# Patient Record
Sex: FEMALE | Race: WHITE | NOT HISPANIC OR LATINO
[De-identification: names, ages, dates, MRNs, and addresses within clinical notes are randomized per-mention and may not be internally consistent; named-entity substitution may affect disease eponyms.]

---

## 2017-08-12 ENCOUNTER — APPOINTMENT (OUTPATIENT)
Dept: MRI IMAGING | Facility: IMAGING CENTER | Age: 82
End: 2017-08-12
Payer: MEDICARE

## 2017-08-12 ENCOUNTER — OUTPATIENT (OUTPATIENT)
Dept: OUTPATIENT SERVICES | Facility: HOSPITAL | Age: 82
LOS: 1 days | End: 2017-08-12
Payer: COMMERCIAL

## 2017-08-12 DIAGNOSIS — Z00.8 ENCOUNTER FOR OTHER GENERAL EXAMINATION: ICD-10-CM

## 2017-08-12 PROCEDURE — 82565 ASSAY OF CREATININE: CPT

## 2017-08-12 PROCEDURE — 0159T: CPT | Mod: 26

## 2017-08-12 PROCEDURE — A9585: CPT

## 2017-08-12 PROCEDURE — 77059 MRI BREAST BILATERAL: CPT | Mod: 26

## 2017-08-12 PROCEDURE — C8908: CPT

## 2017-08-12 PROCEDURE — C8937: CPT

## 2018-05-18 ENCOUNTER — APPOINTMENT (OUTPATIENT)
Dept: PLASTIC SURGERY | Facility: CLINIC | Age: 83
End: 2018-05-18
Payer: MEDICARE

## 2018-05-18 DIAGNOSIS — Z78.9 OTHER SPECIFIED HEALTH STATUS: ICD-10-CM

## 2018-05-18 DIAGNOSIS — G56.03 CARPAL TUNNEL SYNDROM,BILATERAL UPPER LIMBS: ICD-10-CM

## 2018-05-18 PROCEDURE — 99204 OFFICE O/P NEW MOD 45 MIN: CPT

## 2018-05-21 PROBLEM — Z78.9 NON-SMOKER: Status: ACTIVE | Noted: 2018-05-18

## 2018-05-21 PROBLEM — Z78.9 DRINKS WINE: Status: ACTIVE | Noted: 2018-05-18

## 2018-07-18 ENCOUNTER — APPOINTMENT (OUTPATIENT)
Dept: NEUROLOGY | Facility: CLINIC | Age: 83
End: 2018-07-18
Payer: MEDICARE

## 2018-07-18 PROCEDURE — 95910 NRV CNDJ TEST 7-8 STUDIES: CPT

## 2018-07-18 PROCEDURE — 95885 MUSC TST DONE W/NERV TST LIM: CPT

## 2018-12-07 ENCOUNTER — INPATIENT (INPATIENT)
Facility: HOSPITAL | Age: 83
LOS: 4 days | Discharge: HOME HEALTH | DRG: 286 | End: 2018-12-12
Attending: STUDENT IN AN ORGANIZED HEALTH CARE EDUCATION/TRAINING PROGRAM | Admitting: INTERNAL MEDICINE
Payer: COMMERCIAL

## 2018-12-07 VITALS
HEART RATE: 111 BPM | DIASTOLIC BLOOD PRESSURE: 108 MMHG | SYSTOLIC BLOOD PRESSURE: 161 MMHG | OXYGEN SATURATION: 90 % | WEIGHT: 113.54 LBS

## 2018-12-07 DIAGNOSIS — R07.9 CHEST PAIN, UNSPECIFIED: ICD-10-CM

## 2018-12-07 LAB
ALBUMIN SERPL ELPH-MCNC: 3.9 G/DL — SIGNIFICANT CHANGE UP (ref 3.3–5)
ALP SERPL-CCNC: 91 U/L — SIGNIFICANT CHANGE UP (ref 40–120)
ALT FLD-CCNC: 15 U/L — SIGNIFICANT CHANGE UP (ref 10–45)
ANION GAP SERPL CALC-SCNC: 15 MMOL/L — SIGNIFICANT CHANGE UP (ref 5–17)
APTT BLD: 29.7 SEC — SIGNIFICANT CHANGE UP (ref 27.5–36.3)
AST SERPL-CCNC: 26 U/L — SIGNIFICANT CHANGE UP (ref 10–40)
BILIRUB SERPL-MCNC: 0.3 MG/DL — SIGNIFICANT CHANGE UP (ref 0.2–1.2)
BUN SERPL-MCNC: 24 MG/DL — HIGH (ref 7–23)
CALCIUM SERPL-MCNC: 9.9 MG/DL — SIGNIFICANT CHANGE UP (ref 8.4–10.5)
CHLORIDE SERPL-SCNC: 100 MMOL/L — SIGNIFICANT CHANGE UP (ref 96–108)
CO2 SERPL-SCNC: 20 MMOL/L — LOW (ref 22–31)
CREAT SERPL-MCNC: 0.82 MG/DL — SIGNIFICANT CHANGE UP (ref 0.5–1.3)
GLUCOSE SERPL-MCNC: 131 MG/DL — HIGH (ref 70–99)
HCT VFR BLD CALC: 44.1 % — SIGNIFICANT CHANGE UP (ref 34.5–45)
HGB BLD-MCNC: 15.1 G/DL — SIGNIFICANT CHANGE UP (ref 11.5–15.5)
INR BLD: 0.94 RATIO — SIGNIFICANT CHANGE UP (ref 0.88–1.16)
MCHC RBC-ENTMCNC: 30.4 PG — SIGNIFICANT CHANGE UP (ref 27–34)
MCHC RBC-ENTMCNC: 34.4 GM/DL — SIGNIFICANT CHANGE UP (ref 32–36)
MCV RBC AUTO: 88.5 FL — SIGNIFICANT CHANGE UP (ref 80–100)
NT-PROBNP SERPL-SCNC: 128 PG/ML — SIGNIFICANT CHANGE UP (ref 0–300)
PLATELET # BLD AUTO: 157 K/UL — SIGNIFICANT CHANGE UP (ref 150–400)
POTASSIUM SERPL-MCNC: 4.2 MMOL/L — SIGNIFICANT CHANGE UP (ref 3.5–5.3)
POTASSIUM SERPL-SCNC: 4.2 MMOL/L — SIGNIFICANT CHANGE UP (ref 3.5–5.3)
PROT SERPL-MCNC: 7.6 G/DL — SIGNIFICANT CHANGE UP (ref 6–8.3)
PROTHROM AB SERPL-ACNC: 10.7 SEC — SIGNIFICANT CHANGE UP (ref 10–12.9)
RBC # BLD: 4.98 M/UL — SIGNIFICANT CHANGE UP (ref 3.8–5.2)
RBC # FLD: 12.6 % — SIGNIFICANT CHANGE UP (ref 10.3–14.5)
SODIUM SERPL-SCNC: 135 MMOL/L — SIGNIFICANT CHANGE UP (ref 135–145)
TROPONIN T, HIGH SENSITIVITY RESULT: 213 NG/L — HIGH (ref 0–51)
WBC # BLD: 8.3 K/UL — SIGNIFICANT CHANGE UP (ref 3.8–10.5)
WBC # FLD AUTO: 8.3 K/UL — SIGNIFICANT CHANGE UP (ref 3.8–10.5)

## 2018-12-07 PROCEDURE — 93458 L HRT ARTERY/VENTRICLE ANGIO: CPT | Mod: 26,GC

## 2018-12-07 PROCEDURE — 71045 X-RAY EXAM CHEST 1 VIEW: CPT | Mod: 26

## 2018-12-07 PROCEDURE — 99283 EMERGENCY DEPT VISIT LOW MDM: CPT

## 2018-12-07 PROCEDURE — 76937 US GUIDE VASCULAR ACCESS: CPT | Mod: 26,GC

## 2018-12-07 RX ORDER — HEPARIN SODIUM 5000 [USP'U]/ML
5000 INJECTION INTRAVENOUS; SUBCUTANEOUS EVERY 8 HOURS
Qty: 0 | Refills: 0 | Status: DISCONTINUED | OUTPATIENT
Start: 2018-12-07 | End: 2018-12-08

## 2018-12-07 RX ORDER — ATORVASTATIN CALCIUM 80 MG/1
10 TABLET, FILM COATED ORAL AT BEDTIME
Qty: 0 | Refills: 0 | Status: DISCONTINUED | OUTPATIENT
Start: 2018-12-07 | End: 2018-12-12

## 2018-12-07 RX ORDER — HEPARIN SODIUM 5000 [USP'U]/ML
5000 INJECTION INTRAVENOUS; SUBCUTANEOUS ONCE
Qty: 0 | Refills: 0 | Status: COMPLETED | OUTPATIENT
Start: 2018-12-07 | End: 2018-12-07

## 2018-12-07 RX ORDER — CHLORHEXIDINE GLUCONATE 213 G/1000ML
1 SOLUTION TOPICAL
Qty: 0 | Refills: 0 | Status: DISCONTINUED | OUTPATIENT
Start: 2018-12-07 | End: 2018-12-12

## 2018-12-07 RX ORDER — CLOPIDOGREL BISULFATE 75 MG/1
600 TABLET, FILM COATED ORAL ONCE
Qty: 0 | Refills: 0 | Status: COMPLETED | OUTPATIENT
Start: 2018-12-07 | End: 2018-12-07

## 2018-12-07 RX ORDER — ASPIRIN/CALCIUM CARB/MAGNESIUM 324 MG
81 TABLET ORAL DAILY
Qty: 0 | Refills: 0 | Status: DISCONTINUED | OUTPATIENT
Start: 2018-12-07 | End: 2018-12-12

## 2018-12-07 RX ADMIN — HEPARIN SODIUM 5000 UNIT(S): 5000 INJECTION INTRAVENOUS; SUBCUTANEOUS at 21:11

## 2018-12-07 RX ADMIN — CLOPIDOGREL BISULFATE 600 MILLIGRAM(S): 75 TABLET, FILM COATED ORAL at 21:00

## 2018-12-07 NOTE — ED PROVIDER NOTE - OBJECTIVE STATEMENT
87 YOF pmh HTN, BIBEMS from  with STEMI. Cardiology notified prior to arrival at bedside currently. Pt had episode of chest pain prior to arrival earlier in the day. Pt now currently feeling slightly SOB, denies any current chest pain. Pt denies any calf pain, no hx of DVT, PE. Pt took 650mg aspirin at home prior to going to . No other medications given. Pt states she had episode of chest pain this morning, and pt also had worse chest pain episode 2 weeks ago. 87 YOF pmh HTN, BIBEMS from  with STEMI. Cardiology notified prior to arrival at bedside currently. Pt had episode of chest pain prior to arrival earlier in the day. Pt now currently feeling slightly SOB, denies any current chest pain. Pt denies any calf pain, no hx of DVT, PE. Pt took 650mg aspirin at home prior to going to . No other medications given. Pt states she had episode of chest pain this morning, and pt also had worse chest pain episode 2 weeks ago.    Attendinyo female presents with chest pressure intermittently for a few weeks.  today symptoms were more intense.  she went to [facility] and had EKG showing ST elevation in leads V3-V5.  Pt had 650 ASA po today prior to going to [facility].  here pt has mild pressure and STEMI on EKG.  Cardiology called when ekg was faxed to us by EMS prior to pt arrival.

## 2018-12-07 NOTE — ED PROVIDER NOTE - CONSTITUTIONAL, MLM
normal... well appearing, awake, alert, oriented to person, place, time/situation and in no apparent distress.

## 2018-12-07 NOTE — ED PROVIDER NOTE - MEDICAL DECISION MAKING DETAILS
STEMI on EKG.  pt with mild symptoms now and comfortable.  cardiology at bedside.  asa already given.  will load with clopridigel and likely admit for cath.

## 2018-12-07 NOTE — CONSULT NOTE ADULT - SUBJECTIVE AND OBJECTIVE BOX
Patient seen and evaluated at bedside    Chief Complaint:  chest pain, high BP    HPI:  87F with history of HTN, HLD, breast CA s/p mastectomy who presents with 2 weeks of intermittent L sided chest pain and with increasing BP at home. She cannot recall what she was doing when it started, did not worsen with exertion. No SOB at that time. Her BP continued to be high and she continued to have intermittent chest pain so she went to urgent care today and was found to have ST elevations in V3-V6, I and aVL. She was given ASA 325mg x2 and sent to the ED. Having 1-2/10 chest tightness down in ED that resolved.   She takes lisinopril and pravachol at home.    PMH:   HTN (hypertension)  HLD  Breast CA    PSH:       Medications:       Allergies:  Allergy Status Unknown      FAMILY HISTORY:      Social History:  Smoking History:   Alcohol Use: denies  Drug Use: denies    REVIEW OF SYSTEMS:  CONSTITUTIONAL: No weakness, fevers or chills  EYES/ENT: No visual changes;  No dysphagia  NECK: No pain or stiffness  RESPIRATORY: No cough, wheezing, hemoptysis; No shortness of breath  CARDIOVASCULAR: +chest pain or palpitations; No lower extremity edema  GASTROINTESTINAL: No abdominal or epigastric pain. No nausea, vomiting, or hematemesis; No diarrhea or constipation. No melena or hematochezia.  BACK: No back pain  GENITOURINARY: No dysuria, frequency or hematuria  NEUROLOGICAL: No numbness or weakness  SKIN: No itching, burning, rashes, or lesions   All other review of systems is negative unless indicated above.    Physical Exam:  T(F): 96 (12-07), Max: 96 (12-07)  HR: 105 (12-07) (105 - 122)  BP: 147/95 (12-07) (139/97 - 161/108)  RR: 22 (12-07)  SpO2: 98% (12-07)  GENERAL: No acute distress  HEAD:  Atraumatic, Normocephalic  ENT: EOMI, PERRLA, conjunctiva and sclera clear, Neck supple, No JVD, moist mucosa  CHEST/LUNG: Clear to auscultation bilaterally  BACK: No spinal tenderness  HEART: Tachycardic, reg rhythm   ABDOMEN: Soft, Nontender  EXTREMITIES:  NoLE edema  PSYCH: Nl behavior, nl affect  NEUROLOGY: AAOx3, non-focal, cranial nerves intact      Labs: Personally reviewed                        15.1   8.3   )-----------( 157      ( 07 Dec 2018 21:15 )             44.1           PT/INR - ( 07 Dec 2018 21:15 )   PT: 10.7 sec;   INR: 0.94 ratio         PTT - ( 07 Dec 2018 21:15 )  PTT:29.7 sec

## 2018-12-07 NOTE — ED PROVIDER NOTE - NS ED ROS FT
CONSTITUTIONAL: No fevers, no chills  Eyes: no visual changes  Ears: no ear drainage, no ear pain  Nose: no nasal congestion  Mouth/Throat: no sore throat  Cardiovascular: + Chest pain  Respiratory: No SOB  Gastrointestinal: No n/v/d, no abd pain  Genitourinary: no dysuria, no hematuria  SKIN: no rashes.  NEURO: no headache.

## 2018-12-07 NOTE — ED ADULT NURSE NOTE - INTERVENTIONS DEFINITIONS
Room bathroom lighting operational/Monitor for mental status changes and reorient to person, place, and time/[place] to call system/Stretcher in lowest position, wheels locked, appropriate side rails in place/Call bell, personal items and telephone within reach/Instruct patient to call for assistance/Physically safe environment: no spills, clutter or unnecessary equipment

## 2018-12-07 NOTE — ED ADULT NURSE NOTE - OBJECTIVE STATEMENT
87 year old female presents to the ED via EMS with STEMI. Pt had c/o intermittent palpitations and chest discomfort this afternoon, checked her BP and noticed elevated levels. Pt went to Urgent care center and noticed STEMI and sent to ED as transfer for cardiology workup. Pt has no c/o SOB upon arrival to ED and minimal c/o chest pressure, no diaphoresis noted. Mottling to left hand s/p lymphedema from left sided mastectomy. Patient tachypneic and low O2 sat with nasal cannula applied. EKG completed, cardiology at bedside and cath lab nurse at bedside for report. No c/o HA, dizziness, fever or chills. Patient escorted to cath lab. Comfort and safety measures maintained.

## 2018-12-07 NOTE — CHART NOTE - NSCHARTNOTEFT_GEN_A_CORE
CCU Accept Note    Transfer from: ( x ) Medicine    (  ) Telemetry     (   ) RCU        (    ) Palliative         (   ) Stroke Unit          (   ) __________________    Accepting physican: Dr Bennett    HPI:  The patient is an 87F with a history of HTN, HLD, breast CA (s/p mastectomy) who presents with a 2 week history of intermittent L sided chest pain. In the ED, the ECG concerning for an anterior STEMI however patient was almost chest pain free upon arrival. There was concern that it could be a late presentation STEMI. The patient received ASA load, clopidogrel, HSQ 5000, and was taken emergently to the cath lab. The LHC did not demonstrate any clinically relevant obstruction. Official cath report pending. Patient admitted to CCU. 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 The patient reports she is redoing her kitchen which is causing her to be stressed out.     SUBJECTIVE DATA:    OBJECTIVE DATA:    Vital Signs Last 24 Hrs  T(C): 35.6 (07 Dec 2018 21:16), Max: 35.6 (07 Dec 2018 21:16)  T(F): 96 (07 Dec 2018 21:16), Max: 96 (07 Dec 2018 21:16)  HR: 95 (07 Dec 2018 23:00) (95 - 122)  BP: 98/65 (07 Dec 2018 23:00) (98/65 - 161/108)  BP(mean): 78 (07 Dec 2018 23:00) (78 - 78)  RR: 24 (07 Dec 2018 23:00) (22 - 26)  SpO2: 94% (07 Dec 2018 23:00) (90% - 98%)  I&O's Summary      Allergies:      MEDICATIONS  (STANDING):  chlorhexidine 4% Liquid 1 Application(s) Topical <User Schedule>  heparin  Injectable 5000 Unit(s) SubCutaneous every 8 hours    MEDICATIONS  (PRN):      LABS                                            15.1                  Neurophils% (auto):   x      (12-07 @ 21:15):    8.3  )-----------(157          Lymphocytes% (auto):  x                                             44.1                   Eosinphils% (auto):   x        Manual%: Neutrophils x    ; Lymphocytes x    ; Eosinophils x    ; Bands%: x    ; Blasts x                                    135    |  100    |  24                  Calcium: 9.9   / iCa: x      (12-07 @ 21:15)    ----------------------------<  131       Magnesium: x                                4.2     |  20     |  0.82             Phosphorous: x        TPro  7.6    /  Alb  3.9    /  TBili  0.3    /  DBili  x      /  AST  26     /  ALT  15     /  AlkPhos  91     07 Dec 2018 21:15    ( 12-07 @ 21:15 )   PT: 10.7 sec;   INR: 0.94 ratio  aPTT: 29.7 sec      EKG:  Telemetry:  Echo:  Cardiac Cath:  Stress Test:  Imaging    ASSESSMENT & PLAN:

## 2018-12-07 NOTE — CONSULT NOTE ADULT - ASSESSMENT
87F with history of HTN, HLD, breast CA s/p mastectomy who presents with 2 weeks of intermittent L sided chest pain and with increasing BP at home. ECG concerning for STEMI however patient almost chest pain free, could be late presentation STEMI. Already received .    -plavix 600, heparin 5000u   -will plan to take to cath lab urgently  -will need TTE    Brianna Curtis MD  Cardiology Fellow PGY-5  [phone]

## 2018-12-08 DIAGNOSIS — Z90.12 ACQUIRED ABSENCE OF LEFT BREAST AND NIPPLE: Chronic | ICD-10-CM

## 2018-12-08 LAB
ANION GAP SERPL CALC-SCNC: 13 MMOL/L — SIGNIFICANT CHANGE UP (ref 5–17)
ANION GAP SERPL CALC-SCNC: 13 MMOL/L — SIGNIFICANT CHANGE UP (ref 5–17)
ANION GAP SERPL CALC-SCNC: 16 MMOL/L — SIGNIFICANT CHANGE UP (ref 5–17)
ANION GAP SERPL CALC-SCNC: 16 MMOL/L — SIGNIFICANT CHANGE UP (ref 5–17)
APTT BLD: 28.8 SEC — SIGNIFICANT CHANGE UP (ref 27.5–36.3)
BLD GP AB SCN SERPL QL: NEGATIVE — SIGNIFICANT CHANGE UP
BUN SERPL-MCNC: 26 MG/DL — HIGH (ref 7–23)
BUN SERPL-MCNC: 26 MG/DL — HIGH (ref 7–23)
BUN SERPL-MCNC: 27 MG/DL — HIGH (ref 7–23)
BUN SERPL-MCNC: 28 MG/DL — HIGH (ref 7–23)
CALCIUM SERPL-MCNC: 8.4 MG/DL — SIGNIFICANT CHANGE UP (ref 8.4–10.5)
CALCIUM SERPL-MCNC: 8.7 MG/DL — SIGNIFICANT CHANGE UP (ref 8.4–10.5)
CALCIUM SERPL-MCNC: 8.9 MG/DL — SIGNIFICANT CHANGE UP (ref 8.4–10.5)
CALCIUM SERPL-MCNC: 9.2 MG/DL — SIGNIFICANT CHANGE UP (ref 8.4–10.5)
CHLORIDE SERPL-SCNC: 94 MMOL/L — LOW (ref 96–108)
CHLORIDE SERPL-SCNC: 96 MMOL/L — SIGNIFICANT CHANGE UP (ref 96–108)
CHLORIDE SERPL-SCNC: 99 MMOL/L — SIGNIFICANT CHANGE UP (ref 96–108)
CHLORIDE SERPL-SCNC: 99 MMOL/L — SIGNIFICANT CHANGE UP (ref 96–108)
CHOLEST SERPL-MCNC: 168 MG/DL — SIGNIFICANT CHANGE UP (ref 10–199)
CK MB BLD-MCNC: 12.2 % — HIGH (ref 0–3.5)
CK MB BLD-MCNC: 5.2 % — HIGH (ref 0–3.5)
CK MB BLD-MCNC: 7.1 % — HIGH (ref 0–3.5)
CK MB BLD-MCNC: 9.9 % — HIGH (ref 0–3.5)
CK MB CFR SERPL CALC: 10.6 NG/ML — HIGH (ref 0–3.8)
CK MB CFR SERPL CALC: 14.4 NG/ML — HIGH (ref 0–3.8)
CK MB CFR SERPL CALC: 24.8 NG/ML — HIGH (ref 0–3.8)
CK MB CFR SERPL CALC: 33.2 NG/ML — HIGH (ref 0–3.8)
CK SERPL-CCNC: 204 U/L — HIGH (ref 25–170)
CK SERPL-CCNC: 205 U/L — HIGH (ref 25–170)
CK SERPL-CCNC: 250 U/L — HIGH (ref 25–170)
CK SERPL-CCNC: 273 U/L — HIGH (ref 25–170)
CO2 SERPL-SCNC: 19 MMOL/L — LOW (ref 22–31)
CO2 SERPL-SCNC: 21 MMOL/L — LOW (ref 22–31)
CO2 SERPL-SCNC: 21 MMOL/L — LOW (ref 22–31)
CO2 SERPL-SCNC: 22 MMOL/L — SIGNIFICANT CHANGE UP (ref 22–31)
CREAT SERPL-MCNC: 0.82 MG/DL — SIGNIFICANT CHANGE UP (ref 0.5–1.3)
CREAT SERPL-MCNC: 0.89 MG/DL — SIGNIFICANT CHANGE UP (ref 0.5–1.3)
CREAT SERPL-MCNC: 0.91 MG/DL — SIGNIFICANT CHANGE UP (ref 0.5–1.3)
CREAT SERPL-MCNC: 0.96 MG/DL — SIGNIFICANT CHANGE UP (ref 0.5–1.3)
GLUCOSE SERPL-MCNC: 111 MG/DL — HIGH (ref 70–99)
GLUCOSE SERPL-MCNC: 114 MG/DL — HIGH (ref 70–99)
GLUCOSE SERPL-MCNC: 134 MG/DL — HIGH (ref 70–99)
GLUCOSE SERPL-MCNC: 215 MG/DL — HIGH (ref 70–99)
HBA1C BLD-MCNC: 5.4 % — SIGNIFICANT CHANGE UP (ref 4–5.6)
HCT VFR BLD CALC: 33.6 % — LOW (ref 34.5–45)
HCT VFR BLD CALC: 33.7 % — LOW (ref 34.5–45)
HCT VFR BLD CALC: 38.2 % — SIGNIFICANT CHANGE UP (ref 34.5–45)
HCT VFR BLD CALC: 41.7 % — SIGNIFICANT CHANGE UP (ref 34.5–45)
HDLC SERPL-MCNC: 72 MG/DL — SIGNIFICANT CHANGE UP
HGB BLD-MCNC: 11.6 G/DL — SIGNIFICANT CHANGE UP (ref 11.5–15.5)
HGB BLD-MCNC: 12 G/DL — SIGNIFICANT CHANGE UP (ref 11.5–15.5)
HGB BLD-MCNC: 13.1 G/DL — SIGNIFICANT CHANGE UP (ref 11.5–15.5)
HGB BLD-MCNC: 14.1 G/DL — SIGNIFICANT CHANGE UP (ref 11.5–15.5)
INR BLD: 0.96 RATIO — SIGNIFICANT CHANGE UP (ref 0.88–1.16)
LACTATE SERPL-SCNC: 2.3 MMOL/L — HIGH (ref 0.7–2)
LIPID PNL WITH DIRECT LDL SERPL: 81 MG/DL — SIGNIFICANT CHANGE UP
MAGNESIUM SERPL-MCNC: 1.8 MG/DL — SIGNIFICANT CHANGE UP (ref 1.6–2.6)
MAGNESIUM SERPL-MCNC: 1.8 MG/DL — SIGNIFICANT CHANGE UP (ref 1.6–2.6)
MAGNESIUM SERPL-MCNC: 1.9 MG/DL — SIGNIFICANT CHANGE UP (ref 1.6–2.6)
MAGNESIUM SERPL-MCNC: 2 MG/DL — SIGNIFICANT CHANGE UP (ref 1.6–2.6)
MCHC RBC-ENTMCNC: 29.8 PG — SIGNIFICANT CHANGE UP (ref 27–34)
MCHC RBC-ENTMCNC: 30.5 PG — SIGNIFICANT CHANGE UP (ref 27–34)
MCHC RBC-ENTMCNC: 30.5 PG — SIGNIFICANT CHANGE UP (ref 27–34)
MCHC RBC-ENTMCNC: 31.6 PG — SIGNIFICANT CHANGE UP (ref 27–34)
MCHC RBC-ENTMCNC: 33.9 GM/DL — SIGNIFICANT CHANGE UP (ref 32–36)
MCHC RBC-ENTMCNC: 34.3 GM/DL — SIGNIFICANT CHANGE UP (ref 32–36)
MCHC RBC-ENTMCNC: 34.6 GM/DL — SIGNIFICANT CHANGE UP (ref 32–36)
MCHC RBC-ENTMCNC: 35.5 GM/DL — SIGNIFICANT CHANGE UP (ref 32–36)
MCV RBC AUTO: 87.9 FL — SIGNIFICANT CHANGE UP (ref 80–100)
MCV RBC AUTO: 88.3 FL — SIGNIFICANT CHANGE UP (ref 80–100)
MCV RBC AUTO: 88.8 FL — SIGNIFICANT CHANGE UP (ref 80–100)
MCV RBC AUTO: 88.9 FL — SIGNIFICANT CHANGE UP (ref 80–100)
PHOSPHATE SERPL-MCNC: 3 MG/DL — SIGNIFICANT CHANGE UP (ref 2.5–4.5)
PHOSPHATE SERPL-MCNC: 3.2 MG/DL — SIGNIFICANT CHANGE UP (ref 2.5–4.5)
PHOSPHATE SERPL-MCNC: 4.1 MG/DL — SIGNIFICANT CHANGE UP (ref 2.5–4.5)
PHOSPHATE SERPL-MCNC: 4.1 MG/DL — SIGNIFICANT CHANGE UP (ref 2.5–4.5)
PLATELET # BLD AUTO: 128 K/UL — LOW (ref 150–400)
PLATELET # BLD AUTO: 140 K/UL — LOW (ref 150–400)
PLATELET # BLD AUTO: 169 K/UL — SIGNIFICANT CHANGE UP (ref 150–400)
PLATELET # BLD AUTO: 171 K/UL — SIGNIFICANT CHANGE UP (ref 150–400)
POTASSIUM SERPL-MCNC: 3.8 MMOL/L — SIGNIFICANT CHANGE UP (ref 3.5–5.3)
POTASSIUM SERPL-MCNC: 3.9 MMOL/L — SIGNIFICANT CHANGE UP (ref 3.5–5.3)
POTASSIUM SERPL-MCNC: 4.3 MMOL/L — SIGNIFICANT CHANGE UP (ref 3.5–5.3)
POTASSIUM SERPL-MCNC: 4.5 MMOL/L — SIGNIFICANT CHANGE UP (ref 3.5–5.3)
POTASSIUM SERPL-SCNC: 3.8 MMOL/L — SIGNIFICANT CHANGE UP (ref 3.5–5.3)
POTASSIUM SERPL-SCNC: 3.9 MMOL/L — SIGNIFICANT CHANGE UP (ref 3.5–5.3)
POTASSIUM SERPL-SCNC: 4.3 MMOL/L — SIGNIFICANT CHANGE UP (ref 3.5–5.3)
POTASSIUM SERPL-SCNC: 4.5 MMOL/L — SIGNIFICANT CHANGE UP (ref 3.5–5.3)
PROTHROM AB SERPL-ACNC: 10.9 SEC — SIGNIFICANT CHANGE UP (ref 10–12.9)
RAPID RVP RESULT: SIGNIFICANT CHANGE UP
RBC # BLD: 3.79 M/UL — LOW (ref 3.8–5.2)
RBC # BLD: 3.8 M/UL — SIGNIFICANT CHANGE UP (ref 3.8–5.2)
RBC # BLD: 4.3 M/UL — SIGNIFICANT CHANGE UP (ref 3.8–5.2)
RBC # BLD: 4.74 M/UL — SIGNIFICANT CHANGE UP (ref 3.8–5.2)
RBC # FLD: 12.1 % — SIGNIFICANT CHANGE UP (ref 10.3–14.5)
RBC # FLD: 12.5 % — SIGNIFICANT CHANGE UP (ref 10.3–14.5)
RBC # FLD: 12.7 % — SIGNIFICANT CHANGE UP (ref 10.3–14.5)
RBC # FLD: 12.8 % — SIGNIFICANT CHANGE UP (ref 10.3–14.5)
RH IG SCN BLD-IMP: POSITIVE — SIGNIFICANT CHANGE UP
SODIUM SERPL-SCNC: 129 MMOL/L — LOW (ref 135–145)
SODIUM SERPL-SCNC: 131 MMOL/L — LOW (ref 135–145)
SODIUM SERPL-SCNC: 133 MMOL/L — LOW (ref 135–145)
SODIUM SERPL-SCNC: 136 MMOL/L — SIGNIFICANT CHANGE UP (ref 135–145)
TOTAL CHOLESTEROL/HDL RATIO MEASUREMENT: 2.3 RATIO — LOW (ref 3.3–7.1)
TRIGL SERPL-MCNC: 74 MG/DL — SIGNIFICANT CHANGE UP (ref 10–149)
TROPONIN T, HIGH SENSITIVITY RESULT: 1142 NG/L — HIGH (ref 0–51)
TROPONIN T, HIGH SENSITIVITY RESULT: 1467 NG/L — HIGH (ref 0–51)
TROPONIN T, HIGH SENSITIVITY RESULT: 760 NG/L — HIGH (ref 0–51)
TROPONIN T, HIGH SENSITIVITY RESULT: 764 NG/L — HIGH (ref 0–51)
TSH SERPL-MCNC: 1.5 UIU/ML — SIGNIFICANT CHANGE UP (ref 0.27–4.2)
WBC # BLD: 11.1 K/UL — HIGH (ref 3.8–10.5)
WBC # BLD: 11.4 K/UL — HIGH (ref 3.8–10.5)
WBC # BLD: 8.6 K/UL — SIGNIFICANT CHANGE UP (ref 3.8–10.5)
WBC # BLD: 8.9 K/UL — SIGNIFICANT CHANGE UP (ref 3.8–10.5)
WBC # FLD AUTO: 11.1 K/UL — HIGH (ref 3.8–10.5)
WBC # FLD AUTO: 11.4 K/UL — HIGH (ref 3.8–10.5)
WBC # FLD AUTO: 8.6 K/UL — SIGNIFICANT CHANGE UP (ref 3.8–10.5)
WBC # FLD AUTO: 8.9 K/UL — SIGNIFICANT CHANGE UP (ref 3.8–10.5)

## 2018-12-08 PROCEDURE — 93306 TTE W/DOPPLER COMPLETE: CPT | Mod: 26

## 2018-12-08 PROCEDURE — 99291 CRITICAL CARE FIRST HOUR: CPT

## 2018-12-08 RX ORDER — METOPROLOL TARTRATE 50 MG
12.5 TABLET ORAL EVERY 12 HOURS
Qty: 0 | Refills: 0 | Status: DISCONTINUED | OUTPATIENT
Start: 2018-12-08 | End: 2018-12-08

## 2018-12-08 RX ORDER — CEFTRIAXONE 500 MG/1
INJECTION, POWDER, FOR SOLUTION INTRAMUSCULAR; INTRAVENOUS
Qty: 0 | Refills: 0 | Status: DISCONTINUED | OUTPATIENT
Start: 2018-12-08 | End: 2018-12-09

## 2018-12-08 RX ORDER — HEPARIN SODIUM 5000 [USP'U]/ML
5000 INJECTION INTRAVENOUS; SUBCUTANEOUS EVERY 12 HOURS
Qty: 0 | Refills: 0 | Status: DISCONTINUED | OUTPATIENT
Start: 2018-12-08 | End: 2018-12-12

## 2018-12-08 RX ORDER — SODIUM CHLORIDE 9 MG/ML
250 INJECTION, SOLUTION INTRAVENOUS ONCE
Qty: 0 | Refills: 0 | Status: DISCONTINUED | OUTPATIENT
Start: 2018-12-08 | End: 2018-12-08

## 2018-12-08 RX ORDER — AZITHROMYCIN 500 MG/1
500 TABLET, FILM COATED ORAL ONCE
Qty: 0 | Refills: 0 | Status: COMPLETED | OUTPATIENT
Start: 2018-12-08 | End: 2018-12-08

## 2018-12-08 RX ORDER — SODIUM CHLORIDE 9 MG/ML
250 INJECTION INTRAMUSCULAR; INTRAVENOUS; SUBCUTANEOUS ONCE
Qty: 0 | Refills: 0 | Status: COMPLETED | OUTPATIENT
Start: 2018-12-08 | End: 2018-12-08

## 2018-12-08 RX ORDER — CEFTRIAXONE 500 MG/1
1 INJECTION, POWDER, FOR SOLUTION INTRAMUSCULAR; INTRAVENOUS ONCE
Qty: 0 | Refills: 0 | Status: COMPLETED | OUTPATIENT
Start: 2018-12-08 | End: 2018-12-08

## 2018-12-08 RX ORDER — CEFTRIAXONE 500 MG/1
1 INJECTION, POWDER, FOR SOLUTION INTRAMUSCULAR; INTRAVENOUS EVERY 24 HOURS
Qty: 0 | Refills: 0 | Status: DISCONTINUED | OUTPATIENT
Start: 2018-12-09 | End: 2018-12-09

## 2018-12-08 RX ORDER — AZITHROMYCIN 500 MG/1
500 TABLET, FILM COATED ORAL EVERY 24 HOURS
Qty: 0 | Refills: 0 | Status: DISCONTINUED | OUTPATIENT
Start: 2018-12-09 | End: 2018-12-09

## 2018-12-08 RX ORDER — POTASSIUM CHLORIDE 20 MEQ
20 PACKET (EA) ORAL ONCE
Qty: 0 | Refills: 0 | Status: COMPLETED | OUTPATIENT
Start: 2018-12-08 | End: 2018-12-08

## 2018-12-08 RX ORDER — ACETAMINOPHEN 500 MG
650 TABLET ORAL ONCE
Qty: 0 | Refills: 0 | Status: COMPLETED | OUTPATIENT
Start: 2018-12-08 | End: 2018-12-08

## 2018-12-08 RX ORDER — AZITHROMYCIN 500 MG/1
TABLET, FILM COATED ORAL
Qty: 0 | Refills: 0 | Status: DISCONTINUED | OUTPATIENT
Start: 2018-12-08 | End: 2018-12-09

## 2018-12-08 RX ORDER — VASOPRESSIN 20 [USP'U]/ML
0.04 INJECTION INTRAVENOUS
Qty: 100 | Refills: 0 | Status: DISCONTINUED | OUTPATIENT
Start: 2018-12-08 | End: 2018-12-09

## 2018-12-08 RX ORDER — MAGNESIUM SULFATE 500 MG/ML
1 VIAL (ML) INJECTION ONCE
Qty: 0 | Refills: 0 | Status: COMPLETED | OUTPATIENT
Start: 2018-12-08 | End: 2018-12-08

## 2018-12-08 RX ADMIN — CEFTRIAXONE 100 GRAM(S): 500 INJECTION, POWDER, FOR SOLUTION INTRAMUSCULAR; INTRAVENOUS at 09:30

## 2018-12-08 RX ADMIN — ATORVASTATIN CALCIUM 10 MILLIGRAM(S): 80 TABLET, FILM COATED ORAL at 21:59

## 2018-12-08 RX ADMIN — Medication 650 MILLIGRAM(S): at 01:00

## 2018-12-08 RX ADMIN — Medication 20 MILLIEQUIVALENT(S): at 23:03

## 2018-12-08 RX ADMIN — HEPARIN SODIUM 5000 UNIT(S): 5000 INJECTION INTRAVENOUS; SUBCUTANEOUS at 06:03

## 2018-12-08 RX ADMIN — SODIUM CHLORIDE 83.33 MILLILITER(S): 9 INJECTION INTRAMUSCULAR; INTRAVENOUS; SUBCUTANEOUS at 07:41

## 2018-12-08 RX ADMIN — AZITHROMYCIN 250 MILLIGRAM(S): 500 TABLET, FILM COATED ORAL at 09:30

## 2018-12-08 RX ADMIN — HEPARIN SODIUM 5000 UNIT(S): 5000 INJECTION INTRAVENOUS; SUBCUTANEOUS at 17:03

## 2018-12-08 RX ADMIN — VASOPRESSIN 2.4 UNIT(S)/MIN: 20 INJECTION INTRAVENOUS at 10:47

## 2018-12-08 RX ADMIN — Medication 12.5 MILLIGRAM(S): at 06:02

## 2018-12-08 RX ADMIN — SODIUM CHLORIDE 250 MILLILITER(S): 9 INJECTION INTRAMUSCULAR; INTRAVENOUS; SUBCUTANEOUS at 08:47

## 2018-12-08 RX ADMIN — Medication 100 GRAM(S): at 23:03

## 2018-12-08 RX ADMIN — CHLORHEXIDINE GLUCONATE 1 APPLICATION(S): 213 SOLUTION TOPICAL at 06:03

## 2018-12-08 RX ADMIN — Medication 650 MILLIGRAM(S): at 00:25

## 2018-12-08 RX ADMIN — Medication 81 MILLIGRAM(S): at 12:17

## 2018-12-08 NOTE — PROGRESS NOTE ADULT - SUBJECTIVE AND OBJECTIVE BOX
Patient is a 87y old  Female who presents with a chief complaint of Chest pain (08 Dec 2018 00:16)      SUBJECTIVE / OVERNIGHT EVENTS:    Patient seen and examined at bedside. No acute events overnight.    Per H and P: The patient is an 87F with a history of HTN, HLD, breast CA (s/p mastectomy) who presents with a 2 week history of intermittent L sided chest pain. In the ED, the ECG concerning for an anterior STEMI however patient was almost chest pain free upon arrival. There was concern that it could be a late presentation STEMI. The patient received ASA load, clopidogrel, HSQ 5000, and was taken emergently to the cath lab. The C did not demonstrate any clinically relevant obstruction. Official cath report pending. Patient admitted to CCU. 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 The patient reports she is redoing her kitchen which is causing her to be stressed out. Patient reports the pain was intermittent, would last for a short time, and would sometimes be as bad as 7/10. She denies exacerbating or relieving symptoms. She reports she has been belching more often recently however denies and sob, diaphoresis, nausea, or vomiting.       She reports that she does have chronic, left chest discomfort which she believes is related to her left breast mastectomy She had an US and an MRI of her left chest a few years ago to evaluate the mastectomy and reports there were no issues found.     She reports that 15-20 years ago she had a "small cardiac arrest." She does not believe she had a cath at that time but does not remember clearly. She had an echo a few months ago which she and her daughters say was normal. She has an appointment to see a cardiologist in February. She sees her PCP regularly.     Home meds include: ASA 81, Pravastatin 40, Lisinopril 10.     She endorses allergies to PCN (rash/fever) and sulfa drugs     PCP Dr. Delicia Jarvis [facility].     Review of systems: No chest pain, no shortness of breath, no abdominal pain, no nausea, no vomiting, no diarrhea, no fevers, and no chills overnight.     MEDICATIONS  (STANDING):  aspirin enteric coated 81 milliGRAM(s) Oral daily  atorvastatin 10 milliGRAM(s) Oral at bedtime  chlorhexidine 4% Liquid 1 Application(s) Topical <User Schedule>  heparin  Injectable 5000 Unit(s) SubCutaneous every 12 hours  metoprolol tartrate 12.5 milliGRAM(s) Oral every 12 hours    MEDICATIONS  (PRN):      T(F): 97.3 (12-08 @ 03:00), Max: 97.3 (12-08 @ 03:00)  HR: 82 (12-08 @ 06:31) (82 - 122)  BP: 81/41 (12-08 @ 06:31) (76/48 - 161/108)  RR: 18 (12-08 @ 06:31) (17 - 30)  SpO2: 97% (12-08 @ 06:31) (90% - 98%)  CAPILLARY BLOOD GLUCOSE        I&O's Summary    07 Dec 2018 07:01  -  08 Dec 2018 06:36  --------------------------------------------------------  IN: 340 mL / OUT: 850 mL / NET: -510 mL        PHYSICAL EXAM:  GEN: Age appropriate female, resting comfortably in bed, no acute distress, non toxic appearing, speaking in complete sentences.   HEENT: Conjunctiva and sclera normal  PULM: Lungs CTAB, no wheezes, rales, rhonchi  CV: Tachycardic, S1S2, no MRG  Abdominal: Soft, nontender to palpation, non-distended, normoactive bowel sounds  Extremities: No edema or cyanosis. Right groin site not swollen or TTP.  NEURO: AAOx3  Skin: No rashes, lesions      LABS:  Labs personally reviewed.                        14.1   11.4  )-----------( 171      ( 08 Dec 2018 03:48 )             41.7     Hgb Trend: 14.1<--, 15.1<--  12-08    136  |  99  |  26<H>  ----------------------------<  114<H>  4.3   |  21<L>  |  0.91    Ca    9.2      08 Dec 2018 03:48  Phos  4.1     12-08  Mg     2.0     12-08    TPro  7.6  /  Alb  3.9  /  TBili  0.3  /  DBili  x   /  AST  26  /  ALT  15  /  AlkPhos  91  12-07    Creatinine Trend: 0.91<--, 0.82<--  PT/INR - ( 08 Dec 2018 03:48 )   PT: 10.9 sec;   INR: 0.96 ratio         PTT - ( 08 Dec 2018 03:48 )  PTT:28.8 sec        [facility]  PGY-2 Pager: [phone]  [facility]-[phone]  Night Float:

## 2018-12-08 NOTE — H&P ADULT - ASSESSMENT
NEUROLOGIC:  Alteration of mental status present. Likely due to ____ (structural (bleed or stroke), encephalitis, meningitis, non convulsive status epilepticus, metabolic cause (endogenous vs exogenous)  Eligible for early mobilization and immediate physical therapy?    SKIN:  Lines:  Decubiti:    GI:  Diet:  GI stress prophylaxis     METABOLIC:  BMP showing evidence of   Liver functions tests show   Endocrine abnormalities include  12-07    135  |  100  |  24<H>  ----------------------------<  131<H>  4.2   |  20<L>  |  0.82    Ca    9.9      07 Dec 2018 21:15    TPro  7.6  /  Alb  3.9  /  TBili  0.3  /  DBili  x   /  AST  26  /  ALT  15  /  AlkPhos  91  12-07      VOLUME ASSESSMENT:  No peripheral edema  No evidence of disturbance of effective circulating volume    HEMATOLOGIC:  CBC results show  Coag panel shows  DVT prophylaxis with                         15.1   8.3   )-----------( 157      ( 07 Dec 2018 21:15 )             44.1     PT/INR - ( 07 Dec 2018 21:15 )   PT: 10.7 sec;   INR: 0.94 ratio         PTT - ( 07 Dec 2018 21:15 )  PTT:29.7 sec    INFECTIOUS DISEASE:  Pt without strong objective or clinical evidence of infection. Will observe off antibiotics    HEMODYNAMICS:  Patient is on pressors due to ____ shock (cardiogenic due to muscle or valve failure, obstructive due to peff, PE, PTX, hypovolemic, vasopegic)     CARDIOVASCULAR:      RESPIRATORY: NEUROLOGIC:  No alteration of mental status present.The patient is eligible for early mobilization and immediate physical therapy    SKIN:  Lines: PIVs  Decubiti: None    GI:  Diet: regular DASH TLC  GI stress prophylaxis not indicated    METABOLIC:  BMP showing no abnormalities  Liver functions tests showing no abnormalities   Endocrine no abnormalities   12-07    135  |  100  |  24<H>  ----------------------------<  131<H>  4.2   |  20<L>  |  0.82    Ca    9.9      07 Dec 2018 21:15    TPro  7.6  /  Alb  3.9  /  TBili  0.3  /  DBili  x   /  AST  26  /  ALT  15  /  AlkPhos  91  12-07      VOLUME ASSESSMENT:  No peripheral edema  No evidence of disturbance of effective circulating volume    HEMATOLOGIC:  CBC results shows no abnormalities  Coag panel shows no abnormalities  DVT prophylaxis with HSQ                        15.1   8.3   )-----------( 157      ( 07 Dec 2018 21:15 )             44.1     PT/INR - ( 07 Dec 2018 21:15 )   PT: 10.7 sec;   INR: 0.94 ratio         PTT - ( 07 Dec 2018 21:15 )  PTT:29.7 sec    INFECTIOUS DISEASE:  Pt without strong objective or clinical evidence of infection. Will observe off antibiotics    HEMODYNAMICS:  Patient is not on pressors    CARDIOVASCULAR:      RESPIRATORY: The patient is an 87F with a history of HTN, HLD, breast CA (s/p mastectomy) who presents with a 2 week history of intermittent L sided chest pain. In the ED, the ECG concerning for an anterior STEMI however patient was almost chest pain free upon arrival. There was concern that it could be a late presentation STEMI. The patient received ASA load, clopidogrel, HSQ 5000, and was taken emergently to the cath lab. The LHC did not demonstrate any clinically relevant obstruction. Patient admitted to CCU. 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    NEUROLOGIC:  No alteration of mental status present.The patient is eligible for early mobilization and immediate physical therapy. PT consult.     SKIN:  Lines: PIVs  Decubiti: None    GI:  Diet: regular DASH TLC  GI stress prophylaxis not indicated    METABOLIC:  BMP showing no abnormalities  Liver functions tests showing no abnormalities   Endocrine no abnormalities   12-07    135  |  100  |  24<H>  ----------------------------<  131<H>  4.2   |  20<L>  |  0.82    Ca    9.9      07 Dec 2018 21:15    TPro  7.6  /  Alb  3.9  /  TBili  0.3  /  DBili  x   /  AST  26  /  ALT  15  /  AlkPhos  91  12-07      VOLUME ASSESSMENT:  No peripheral edema  No evidence of disturbance of effective circulating volume    HEMATOLOGIC:  CBC results shows no abnormalities  Coag panel shows no abnormalities  DVT prophylaxis with HSQ                        15.1   8.3   )-----------( 157      ( 07 Dec 2018 21:15 )             44.1     PT/INR - ( 07 Dec 2018 21:15 )   PT: 10.7 sec;   INR: 0.94 ratio         PTT - ( 07 Dec 2018 21:15 )  PTT:29.7 sec    INFECTIOUS DISEASE:  Pt without strong objective or clinical evidence of infection. Will observe off antibiotics    HEMODYNAMICS:  Patient is not on pressors    CARDIOVASCULAR:  Problem: Takatsubo's Stress Cardiomyopathy  Assessment: p/w 2 week history of intermittent L sided chest pain. ECG concerning for an anterior STEMI however patient was almost chest pain free upon arrival. S/p ASA load, clopidogrel, HSQ 5000, and was taken emergently to the cath lab. The LHC did not demonstrate any clinically relevant obstruction.  Plan: TTE ordered to eval for presence of LVOT obstruction and degree of apical dilatation / cardiac function. Repeat TTE after discharge.     Hold home lisinopril as BP WNL  C/w Atorvastatin 10 instead of pravastatin 40 (home med)    RESPIRATORY:  No active issues The patient is an 87F with a history of HTN, HLD, breast CA (s/p mastectomy) who presents with a 2 week history of intermittent L sided chest pain. In the ED, the ECG concerning for an anterior STEMI however patient was almost chest pain free upon arrival. There was concern that it could be a late presentation STEMI. The patient received ASA load, clopidogrel, HSQ 5000, and was taken emergently to the cath lab. The LHC did not demonstrate any clinically relevant obstruction. Patient admitted to CCU. 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    NEUROLOGIC:  No alteration of mental status present.The patient is eligible for early mobilization and immediate physical therapy. PT consult.     SKIN:  Lines: PIVs  Decubiti: None    GI:  Diet: regular DASH TLC  GI stress prophylaxis not indicated    METABOLIC:  BMP showing no abnormalities  Liver functions tests showing no abnormalities   Endocrine no abnormalities   12-07    135  |  100  |  24<H>  ----------------------------<  131<H>  4.2   |  20<L>  |  0.82    Ca    9.9      07 Dec 2018 21:15    TPro  7.6  /  Alb  3.9  /  TBili  0.3  /  DBili  x   /  AST  26  /  ALT  15  /  AlkPhos  91  12-07      VOLUME ASSESSMENT:  No peripheral edema  No evidence of disturbance of effective circulating volume    HEMATOLOGIC:  CBC results shows no abnormalities  Coag panel shows no abnormalities  DVT prophylaxis with HSQ                        15.1   8.3   )-----------( 157      ( 07 Dec 2018 21:15 )             44.1     PT/INR - ( 07 Dec 2018 21:15 )   PT: 10.7 sec;   INR: 0.94 ratio         PTT - ( 07 Dec 2018 21:15 )  PTT:29.7 sec    INFECTIOUS DISEASE:  Pt without strong objective or clinical evidence of infection. Will observe off antibiotics    HEMODYNAMICS:  Patient is not on pressors    CARDIOVASCULAR:  Problem: Takatsubo's Stress Cardiomyopathy / HFrEF  Assessment: p/w 2 week history of intermittent L sided chest pain. ECG concerning for an anterior STEMI however patient was almost chest pain free upon arrival. S/p ASA load, clopidogrel, HSQ 5000, and was taken emergently to the cath lab. The LHC showed only mid LAD 40 % stenosis. Ventriculogram showed severe anterolateral hypokinesis, severe apical hypokinesis, and severe diaphragmatic hypokinesis. EF estimated was 30 %.  Plan: TTE ordered to eval for presence of LVOT obstruction and further evaluate degree of apical dilatation / cardiac function. Repeat TTE after discharge.   Start BB when BP/HR tolerates  Hold ACEi until TTE performed to eval for LVOT obstruction    Hold home lisinopril as BP WNL and need to evaluate for LVOT obstruction  C/w Atorvastatin 10 instead of pravastatin 40 (home med)    RESPIRATORY:  No active issues The patient is an 87F with a history of HTN, HLD, breast CA (s/p mastectomy) who presents with a 2 week history of intermittent L sided chest pain. In the ED, the ECG concerning for an anterior STEMI however patient was almost chest pain free upon arrival. There was concern that it could be a late presentation STEMI. The patient received ASA load, clopidogrel, HSQ 5000, and was taken emergently to the cath lab. The LHC did not demonstrate any clinically relevant obstruction. 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    NEUROLOGIC:  No alteration of mental status present. The patient is eligible for early mobilization and immediate physical therapy. PT consult ordered.    SKIN:  Lines: PIVs  Decubiti: None    GI:  Diet: regular DASH TLC  GI stress prophylaxis not indicated    METABOLIC:  BMP showing no abnormalities  Liver functions tests showing no abnormalities   Endocrine no abnormalities   12-07    135  |  100  |  24<H>  ----------------------------<  131<H>  4.2   |  20<L>  |  0.82    Ca    9.9      07 Dec 2018 21:15    TPro  7.6  /  Alb  3.9  /  TBili  0.3  /  DBili  x   /  AST  26  /  ALT  15  /  AlkPhos  91  12-07      VOLUME ASSESSMENT:  No peripheral edema  No evidence of disturbance of effective circulating volume    HEMATOLOGIC:  CBC results shows no abnormalities  Coag panel shows no abnormalities  DVT prophylaxis with HSQ                        15.1   8.3   )-----------( 157      ( 07 Dec 2018 21:15 )             44.1     PT/INR - ( 07 Dec 2018 21:15 )   PT: 10.7 sec;   INR: 0.94 ratio         PTT - ( 07 Dec 2018 21:15 )  PTT:29.7 sec    INFECTIOUS DISEASE:  Pt without strong objective or clinical evidence of infection. Will observe off antibiotics    HEMODYNAMICS:  Patient is not on pressors    CARDIOVASCULAR:  Problem: Takatsubo's Stress Cardiomyopathy / HFrEF  Assessment: p/w 2 week history of intermittent L sided chest pain. ECG concerning for an anterior STEMI however patient was almost chest pain free upon arrival. S/p ASA load, clopidogrel, HSQ 5000, and was taken emergently to the cath lab. The LHC showed only mid LAD 40 % stenosis. Ventriculogram showed severe anterolateral hypokinesis, severe apical hypokinesis, and severe diaphragmatic hypokinesis. EF estimated was 30 %.  Plan: TTE ordered to eval for presence of LVOT obstruction and further evaluate degree of apical dilatation / cardiac function. Repeat TTE after discharge.     Problem: HFrEF  Assessment: May be acute/temporary in setting of Takatsubo's. Patient did not have dx of HF prior to two weeks ago reports she had an echo several months ago that was "ok"  Plan: Start metoprolol tart 12.5 BID. Resume lisinopril 10 (home med for HTN). Uptitrate as tolerated. Will consider decreasing/stopping these medications after discharge if EF improves.   ***If TTE shows significant LVOT obstruction, will DC lisinopril      C/w Atorvastatin 10 instead of pravastatin 40 (home med)    RESPIRATORY:  No active issues

## 2018-12-08 NOTE — PROGRESS NOTE ADULT - ASSESSMENT
The patient is an 87F with a history of HTN, HLD, breast CA (s/p mastectomy) who presents with a 2 week history of intermittent L sided chest pain. In the ED, the ECG concerning for an anterior STEMI however patient was almost chest pain free upon arrival. There was concern that it could be a late presentation STEMI. The patient received ASA load, clopidogrel, HSQ 5000, and was taken emergently to the cath lab. The LHC did not demonstrate any clinically relevant obstruction. 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      NEUROLOGIC:  No alteration of mental status present. The patient is eligible for early mobilization and immediate physical therapy. PT consult ordered.    SKIN:  Lines: PIVs  Decubiti: None    GI:  Diet: regular DASH TLC  GI stress prophylaxis not indicated    METABOLIC:  BMP showing no abnormalities  Liver functions tests showing no abnormalities   Endocrine no abnormalities   12-07    135  |  100  |  24<H>  ----------------------------<  131<H>  4.2   |  20<L>  |  0.82    Ca    9.9      07 Dec 2018 21:15    TPro  7.6  /  Alb  3.9  /  TBili  0.3  /  DBili  x   /  AST  26  /  ALT  15  /  AlkPhos  91  12-07      VOLUME ASSESSMENT:  No peripheral edema  No evidence of disturbance of effective circulating volume    HEMATOLOGIC:  CBC results shows no abnormalities  Coag panel shows no abnormalities  DVT prophylaxis with HSQ                        15.1   8.3   )-----------( 157      ( 07 Dec 2018 21:15 )             44.1     PT/INR - ( 07 Dec 2018 21:15 )   PT: 10.7 sec;   INR: 0.94 ratio         PTT - ( 07 Dec 2018 21:15 )  PTT:29.7 sec    INFECTIOUS DISEASE:  Pt without strong objective or clinical evidence of infection. Will observe off antibiotics    HEMODYNAMICS:  Patient is not on pressors    CARDIOVASCULAR:  Problem: Takatsubo's Stress Cardiomyopathy / HFrEF  Assessment: p/w 2 week history of intermittent L sided chest pain. ECG concerning for an anterior STEMI however patient was almost chest pain free upon arrival. S/p ASA load, clopidogrel, HSQ 5000, and was taken emergently to the cath lab. The LHC showed only mid LAD 40 % stenosis. Ventriculogram showed severe anterolateral hypokinesis, severe apical hypokinesis, and severe diaphragmatic hypokinesis. EF estimated was 30 %.  Plan: TTE ordered to eval for presence of LVOT obstruction and further evaluate degree of apical dilatation / cardiac function. Repeat TTE after discharge.     Problem: HFrEF  Assessment: May be acute/temporary in setting of Takatsubo's. Patient did not have dx of HF prior to two weeks ago reports she had an echo several months ago that was "ok"  Plan: Start metoprolol tart 12.5 BID. Resume lisinopril 10 (home med for HTN). Uptitrate as tolerated. Will consider decreasing/stopping these medications after discharge if EF improves.   ***If TTE shows significant LVOT obstruction, will DC lisinopril    C/w Atorvastatin 10 instead of pravastatin 40 (home med)    RESPIRATORY:  No active issues The patient is an 87F with a history of HTN, HLD, breast CA (s/p mastectomy) who presents with a 2 week history of intermittent L sided chest pain. In the ED, the ECG concerning for an anterior STEMI however patient was almost chest pain free upon arrival. There was concern that it could be a late presentation STEMI. The patient received ASA load, clopidogrel, HSQ 5000, and was taken emergently to the cath lab. The LHC did not demonstrate any clinically relevant obstruction. 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      NEUROLOGIC:  No alteration of mental status present. The patient is eligible for early mobilization and immediate physical therapy. PT consult ordered.    SKIN:  Lines: PIVs  Decubiti: None    GI:  Diet: regular DASH TLC  GI stress prophylaxis not indicated    METABOLIC:  BMP showing no abnormalities  Liver functions tests showing no abnormalities   Endocrine no abnormalities   12-07    135  |  100  |  24<H>  ----------------------------<  131<H>  4.2   |  20<L>  |  0.82    Ca    9.9      07 Dec 2018 21:15    TPro  7.6  /  Alb  3.9  /  TBili  0.3  /  DBili  x   /  AST  26  /  ALT  15  /  AlkPhos  91  12-07      VOLUME ASSESSMENT:  No peripheral edema  There is evidence of disturbance of effective circulating volume based on persistent hypotension    HEMATOLOGIC:  CBC results shows no abnormalities  Coag panel shows no abnormalities  DVT prophylaxis with HSQ                        15.1   8.3   )-----------( 157      ( 07 Dec 2018 21:15 )             44.1     PT/INR - ( 07 Dec 2018 21:15 )   PT: 10.7 sec;   INR: 0.94 ratio         PTT - ( 07 Dec 2018 21:15 )  PTT:29.7 sec    INFECTIOUS DISEASE:  Pt without strong objective or clinical evidence of infection. Will observe off antibiotics    HEMODYNAMICS:  Patient is on pressors likely 2/2 to hypovolemic shock. Will use IVF and vasopressin to maintain MAP of 65 or greater    CARDIOVASCULAR:  Problem: Takatsubo's Stress Cardiomyopathy / HFrEF  Assessment: p/w 2 week history of intermittent L sided chest pain. ECG concerning for an anterior STEMI however patient was almost chest pain free upon arrival. S/p ASA load, clopidogrel, HSQ 5000, and was taken emergently to the cath lab. The C showed only mid LAD 40 % stenosis. Ventriculogram showed severe anterolateral hypokinesis, severe apical hypokinesis, and severe diaphragmatic hypokinesis. EF estimated was 30 %.  Plan: TTE ordered to eval for presence of LVOT obstruction and further evaluate degree of apical dilatation / cardiac function. Repeat TTE after discharge.     Problem: HFrEF  Assessment: May be acute/temporary in setting of Takatsubo's. Patient did not have dx of HF prior to two weeks ago reports she had an echo several months ago that was "ok"  Plan: Start metoprolol tart 12.5 BID. Resume lisinopril 10 (home med for HTN). Uptitrate as tolerated. Will consider decreasing/stopping these medications after discharge if EF improves.   ***If TTE shows significant LVOT obstruction, will DC lisinopril    C/w Atorvastatin 10 instead of pravastatin 40 (home med)    RESPIRATORY:  No active issues The patient is an 87F with a history of HTN, HLD, breast CA (s/p mastectomy) who presents with a 2 week history of intermittent L sided chest pain. In the ED, the ECG concerning for an anterior STEMI however patient was almost chest pain free upon arrival. There was concern that it could be a late presentation STEMI. The patient received ASA load, clopidogrel, HSQ 5000, and was taken emergently to the cath lab. The LHC did not demonstrate any clinically relevant obstruction. 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      NEUROLOGIC:  No alteration of mental status present. The patient is eligible for early mobilization and immediate physical therapy. PT consult ordered.    SKIN:  Lines: PIVs  Decubiti: None    GI:  Diet: regular DASH TLC  GI stress prophylaxis not indicated    METABOLIC:  BMP showing no abnormalities  Liver functions tests showing no abnormalities   Endocrine no abnormalities   12-07    135  |  100  |  24<H>  ----------------------------<  131<H>  4.2   |  20<L>  |  0.82    Ca    9.9      07 Dec 2018 21:15    TPro  7.6  /  Alb  3.9  /  TBili  0.3  /  DBili  x   /  AST  26  /  ALT  15  /  AlkPhos  91  12-07      VOLUME ASSESSMENT:  No peripheral edema  There is evidence of disturbance of effective circulating volume based on persistent hypotension    HEMATOLOGIC:  CBC results shows no abnormalities  Coag panel shows no abnormalities  DVT prophylaxis with HSQ                        15.1   8.3   )-----------( 157      ( 07 Dec 2018 21:15 )             44.1     PT/INR - ( 07 Dec 2018 21:15 )   PT: 10.7 sec;   INR: 0.94 ratio         PTT - ( 07 Dec 2018 21:15 )  PTT:29.7 sec    INFECTIOUS DISEASE:  Sepsis: Cough, WBC+, hypotension, tachycardia+ Xray RLL PNA  Bcx x 2, UA, RVP  CTX and Azithro to cover for CAP    HEMODYNAMICS:  Patient is on pressors likely 2/2 to hypovolemic shock. Will use IVF and vasopressin to maintain MAP of 65 or greater    CARDIOVASCULAR:  Problem: Takatsubo's Stress Cardiomyopathy / HFrEF  Assessment: p/w 2 week history of intermittent L sided chest pain. ECG concerning for an anterior STEMI however patient was almost chest pain free upon arrival. S/p ASA load, clopidogrel, HSQ 5000, and was taken emergently to the cath lab. The LHC showed only mid LAD 40 % stenosis. Ventriculogram showed severe anterolateral hypokinesis, severe apical hypokinesis, and severe diaphragmatic hypokinesis. EF estimated was 30 %.  Plan: TTE ordered to eval for presence of LVOT obstruction and further evaluate degree of apical dilatation / cardiac function. Repeat TTE after discharge.     Problem: HFrEF  Assessment: May be acute/temporary in setting of Takatsubo's. Patient did not have dx of HF prior to two weeks ago reports she had an echo several months ago that was "ok"  Plan: Start metoprolol tart 12.5 BID. Resume lisinopril 10 (home med for HTN). Uptitrate as tolerated. Will consider decreasing/stopping these medications after discharge if EF improves.   ***If TTE shows significant LVOT obstruction, will DC lisinopril    C/w Atorvastatin 10 instead of pravastatin 40 (home med)    RESPIRATORY:  No active issues The patient is an 87F with a history of HTN, HLD, breast CA (s/p mastectomy) who presents with a 2 week history of intermittent L sided chest pain. In the ED, the ECG concerning for an anterior STEMI however patient was almost chest pain free upon arrival. There was concern that it could be a late presentation STEMI. The patient received ASA load, clopidogrel, HSQ 5000, and was taken emergently to the cath lab. The LHC did not demonstrate any clinically relevant obstruction. 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      NEUROLOGIC:  No alteration of mental status present. The patient is eligible for early mobilization and immediate physical therapy. PT consult ordered.    SKIN:  Lines: PIVs  Decubiti: None    GI:  Diet: regular DASH TLC  GI stress prophylaxis not indicated    METABOLIC:  BMP showing no abnormalities  Liver functions tests showing no abnormalities   Endocrine no abnormalities   12-07    135  |  100  |  24<H>  ----------------------------<  131<H>  4.2   |  20<L>  |  0.82    Ca    9.9      07 Dec 2018 21:15    TPro  7.6  /  Alb  3.9  /  TBili  0.3  /  DBili  x   /  AST  26  /  ALT  15  /  AlkPhos  91  12-07      VOLUME ASSESSMENT:  No peripheral edema  There is evidence of disturbance of effective circulating volume based on persistent hypotension    HEMATOLOGIC:  CBC results shows no abnormalities  Coag panel shows no abnormalities  DVT prophylaxis with HSQ                        15.1   8.3   )-----------( 157      ( 07 Dec 2018 21:15 )             44.1     PT/INR - ( 07 Dec 2018 21:15 )   PT: 10.7 sec;   INR: 0.94 ratio         PTT - ( 07 Dec 2018 21:15 )  PTT:29.7 sec    INFECTIOUS DISEASE:  Sepsis: Cough, WBC+, hypotension, tachycardia+ Xray RLL PNA  Bcx x 2, UA, RVP  CTX and Azithro to cover for CAP    HEMODYNAMICS:  Patient is on pressors likely 2/2 to hypovolemic shock. Will use IVF and vasopressin to maintain MAP of 65 or greater    CARDIOVASCULAR:  Problem: Takatsubo's Stress Cardiomyopathy / HFrEF  Assessment: p/w 2 week history of intermittent L sided chest pain. ECG concerning for an anterior STEMI however patient was almost chest pain free upon arrival. S/p ASA load, clopidogrel, HSQ 5000, and was taken emergently to the cath lab. The LHC showed only mid LAD 40 % stenosis. Ventriculogram showed severe anterolateral hypokinesis, severe apical hypokinesis, and severe diaphragmatic hypokinesis. EF estimated was 30 %.  Plan: TTE ordered to eval for presence of LVOT obstruction and further evaluate degree of apical dilatation / cardiac function. Repeat TTE after discharge.     Problem: HFrEF  Assessment: May be acute/temporary in setting of Takatsubo's. Patient did not have dx of HF prior to two weeks ago reports she had an echo several months ago that was "ok"  Plan: Start metoprolol tart 12.5 BID. Resume lisinopril 10 (home med for HTN). Uptitrate as tolerated. Will consider decreasing/stopping these medications after discharge if EF improves.   ***If TTE shows significant LVOT obstruction, will DC lisinopril    C/w Atorvastatin 10 instead of pravastatin 40 (home med)    RESPIRATORY:  On NC  PNA txt as above The patient is an 87F with a history of HTN, HLD, breast CA (s/p mastectomy) who presents with a 2 week history of intermittent L sided chest pain. In the ED, the ECG concerning for an anterior STEMI however patient was almost chest pain free upon arrival. There was concern that it could be a late presentation STEMI. The patient received ASA load, clopidogrel, HSQ 5000, and was taken emergently to the cath lab. The LHC did not demonstrate any clinically relevant obstruction. 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 Hospital course c/b CAP (      NEUROLOGIC:  No alteration of mental status present. The patient is eligible for early mobilization and immediate physical therapy. PT consult ordered.    SKIN:  Lines: PIVs  Decubiti: None    GI:  Diet: regular DASH TLC  GI stress prophylaxis not indicated    METABOLIC:  BMP showing no abnormalities  Liver functions tests showing no abnormalities   Endocrine no abnormalities   12-07    135  |  100  |  24<H>  ----------------------------<  131<H>  4.2   |  20<L>  |  0.82    Ca    9.9      07 Dec 2018 21:15    TPro  7.6  /  Alb  3.9  /  TBili  0.3  /  DBili  x   /  AST  26  /  ALT  15  /  AlkPhos  91  12-07      VOLUME ASSESSMENT:  No peripheral edema  There is evidence of disturbance of effective circulating volume based on persistent hypotension    HEMATOLOGIC:  CBC results shows no abnormalities  Coag panel shows no abnormalities  DVT prophylaxis with HSQ                        15.1   8.3   )-----------( 157      ( 07 Dec 2018 21:15 )             44.1     PT/INR - ( 07 Dec 2018 21:15 )   PT: 10.7 sec;   INR: 0.94 ratio         PTT - ( 07 Dec 2018 21:15 )  PTT:29.7 sec    INFECTIOUS DISEASE:  Sepsis: Cough, WBC+, hypotension, tachycardia+ Xray RLL PNA  Bcx x 2, UA, RVP  CTX and Azithro to cover for CAP    HEMODYNAMICS:  Patient is on pressors likely 2/2 to hypovolemic shock. Will use IVF and vasopressin to maintain MAP of 65 or greater    CARDIOVASCULAR:  Problem: Takatsubo's Stress Cardiomyopathy / HFrEF  Assessment: p/w 2 week history of intermittent L sided chest pain. ECG concerning for an anterior STEMI however patient was almost chest pain free upon arrival. S/p ASA load, clopidogrel, HSQ 5000, and was taken emergently to the cath lab. The LHC showed only mid LAD 40 % stenosis. Ventriculogram showed severe anterolateral hypokinesis, severe apical hypokinesis, and severe diaphragmatic hypokinesis. EF estimated was 30 %.  Plan: TTE ordered to eval for presence of LVOT obstruction and further evaluate degree of apical dilatation / cardiac function. Repeat TTE after discharge.     Problem: HFrEF  Assessment: May be acute/temporary in setting of Takatsubo's. Patient did not have dx of HF prior to two weeks ago reports she had an echo several months ago that was "ok"  Plan: Start metoprolol tart 12.5 BID. Resume lisinopril 10 (home med for HTN). Uptitrate as tolerated. Will consider decreasing/stopping these medications after discharge if EF improves.   ***If TTE shows significant LVOT obstruction, will DC lisinopril    C/w Atorvastatin 10 instead of pravastatin 40 (home med)    RESPIRATORY:  On NC  PNA txt as above The patient is an 87F with a history of HTN, HLD, breast CA (s/p mastectomy) who presents with a 2 week history of intermittent L sided chest pain. In the ED, the ECG concerning for an anterior STEMI however patient was almost chest pain free upon arrival. There was concern that it could be a late presentation STEMI. The patient received ASA load, clopidogrel, HSQ 5000, and was taken emergently to the cath lab. The LHC did not demonstrate any clinically relevant obstruction. 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 Hospital course c/b CAP (prod cough, hypotension, tachycardia, WBC, RLL consolidation).      NEUROLOGIC:  No alteration of mental status present. The patient is eligible for early mobilization and immediate physical therapy. PT consult ordered.    SKIN:  Lines: PIVs  Decubiti: None    GI:  Diet: regular DASH TLC  GI stress prophylaxis not indicated    METABOLIC:  BMP showing no abnormalities  Liver functions tests showing no abnormalities   Endocrine no abnormalities   12-07    135  |  100  |  24<H>  ----------------------------<  131<H>  4.2   |  20<L>  |  0.82    Ca    9.9      07 Dec 2018 21:15    TPro  7.6  /  Alb  3.9  /  TBili  0.3  /  DBili  x   /  AST  26  /  ALT  15  /  AlkPhos  91  12-07      VOLUME ASSESSMENT:  No peripheral edema  There is evidence of disturbance of effective circulating volume based on persistent hypotension    HEMATOLOGIC:  CBC results shows no abnormalities  Coag panel shows no abnormalities  DVT prophylaxis with HSQ                        15.1   8.3   )-----------( 157      ( 07 Dec 2018 21:15 )             44.1     PT/INR - ( 07 Dec 2018 21:15 )   PT: 10.7 sec;   INR: 0.94 ratio         PTT - ( 07 Dec 2018 21:15 )  PTT:29.7 sec    INFECTIOUS DISEASE:  Sepsis: Cough, WBC+, hypotension, tachycardia+ Xray RLL PNA  Bcx x 2, UA, RVP  CTX and Azithro to cover for CAP first dose 12/8    HEMODYNAMICS:  Patient is on pressors likely 2/2 to hypovolemic shock. Will use IVF and vasopressin to maintain MAP of 65 or greater    CARDIOVASCULAR:  Problem: Takatsubo's Stress Cardiomyopathy / HFrEF  Assessment: p/w 2 week history of intermittent L sided chest pain. ECG concerning for an anterior STEMI however patient was almost chest pain free upon arrival. S/p ASA load, clopidogrel, HSQ 5000, and was taken emergently to the cath lab. The LHC showed only mid LAD 40 % stenosis. Ventriculogram showed severe anterolateral hypokinesis, severe apical hypokinesis, and severe diaphragmatic hypokinesis. EF estimated was 30 %.  Plan: TTE ordered to eval for presence of LVOT obstruction and further evaluate degree of apical dilatation / cardiac function. Repeat TTE after discharge.     Problem: HFrEF  Assessment: May be acute/temporary in setting of Takatsubo's. Patient did not have dx of HF prior to two weeks ago reports she had an echo several months ago that was "ok"  Plan: Start metoprolol tart 12.5 BID. Resume lisinopril 10 (home med for HTN). Uptitrate as tolerated. Will consider decreasing/stopping these medications after discharge if EF improves.   ***If TTE shows significant LVOT obstruction, will DC lisinopril    C/w Atorvastatin 10 instead of pravastatin 40 (home med)    RESPIRATORY:  On NC  PNA txt as above The patient is an 87F with a history of HTN, HLD, breast CA (s/p mastectomy) who presents with a 2 week history of intermittent L sided chest pain. In the ED, the ECG concerning for an anterior STEMI however patient was almost chest pain free upon arrival. There was concern that it could be a late presentation STEMI. The patient received ASA load, clopidogrel, HSQ 5000, and was taken emergently to the cath lab. The LHC did not demonstrate any clinically relevant obstruction. 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 Hospital course c/b CAP (prod cough, hypotension, tachycardia, WBC, RLL consolidation).      NEUROLOGIC:  No alteration of mental status present. The patient is eligible for early mobilization and immediate physical therapy. PT consult ordered.    SKIN:  Lines: PIVs  Decubiti: None    GI:  Diet: regular DASH TLC  GI stress prophylaxis not indicated    METABOLIC:  BMP showing no abnormalities  Liver functions tests showing no abnormalities   Endocrine no abnormalities   12-07    135  |  100  |  24<H>  ----------------------------<  131<H>  4.2   |  20<L>  |  0.82    Ca    9.9      07 Dec 2018 21:15    TPro  7.6  /  Alb  3.9  /  TBili  0.3  /  DBili  x   /  AST  26  /  ALT  15  /  AlkPhos  91  12-07      VOLUME ASSESSMENT:  No peripheral edema  There is evidence of disturbance of effective circulating volume based on persistent hypotension    HEMATOLOGIC:  CBC results shows no abnormalities  Coag panel shows no abnormalities  DVT prophylaxis with HSQ                        15.1   8.3   )-----------( 157      ( 07 Dec 2018 21:15 )             44.1     PT/INR - ( 07 Dec 2018 21:15 )   PT: 10.7 sec;   INR: 0.94 ratio         PTT - ( 07 Dec 2018 21:15 )  PTT:29.7 sec    INFECTIOUS DISEASE:  Sepsis: Cough, WBC+, hypotension, tachycardia+ Xray RLL PNA  Bcx x 2, UA, RVP  CTX and Azithro to cover for CAP first dose 12/8    HEMODYNAMICS:  Patient is on pressors likely 2/2 to hypovolemic shock. Will use IVF and vasopressin to maintain MAP of 65 or greater    CARDIOVASCULAR:  Problem: Takatsubo's Stress Cardiomyopathy / HFrEF  Assessment: p/w 2 week history of intermittent L sided chest pain. ECG concerning for an anterior STEMI however patient was almost chest pain free upon arrival. S/p ASA load, clopidogrel, HSQ 5000, and was taken emergently to the cath lab. The LHC showed only mid LAD 40 % stenosis. Ventriculogram showed severe anterolateral hypokinesis, severe apical hypokinesis, and severe diaphragmatic hypokinesis. EF estimated was 30 %.  Plan: TTE ordered to eval for presence of LVOT obstruction and further evaluate degree of apical dilatation / cardiac function. Repeat TTE after discharge.     Problem: HFrEF  Assessment: May be acute/temporary in setting of Takatsubo's. Patient did not have dx of HF prior to two weeks ago reports she had an echo several months ago that was "ok"  Plan: Hold metoprolol tart 12.5 BID in setting of hypotension/sepsis. Resume lisinopril 10 (home med for HTN). Uptitrate as tolerated. Will consider decreasing/stopping these medications after discharge if EF improves.   ***If TTE shows significant LVOT obstruction, will DC lisinopril    C/w Atorvastatin 10 instead of pravastatin 40 (home med)    RESPIRATORY:  On NC  PNA txt as above The patient is an 87F with a history of HTN, HLD, breast CA (s/p mastectomy) who presents with a 2 week history of intermittent L sided chest pain. In the ED, the ECG concerning for an anterior STEMI however patient was almost chest pain free upon arrival. There was concern that it could be a late presentation STEMI. The patient received ASA load, clopidogrel, HSQ 5000, and was taken emergently to the cath lab. The LHC did not demonstrate any clinically relevant obstruction. 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 Hospital course c/b CAP (prod cough, hypotension, tachycardia, WBC, RLL consolidation).      NEUROLOGIC:  No alteration of mental status present. The patient is eligible for early mobilization and immediate physical therapy. PT consult ordered.    SKIN:  Lines: PIVs  Decubiti: None    GI:  Diet: regular DASH TLC  GI stress prophylaxis not indicated    METABOLIC:  BMP showing no abnormalities  Liver functions tests showing no abnormalities   Endocrine no abnormalities   12-07    135  |  100  |  24<H>  ----------------------------<  131<H>  4.2   |  20<L>  |  0.82    Ca    9.9      07 Dec 2018 21:15    TPro  7.6  /  Alb  3.9  /  TBili  0.3  /  DBili  x   /  AST  26  /  ALT  15  /  AlkPhos  91  12-07      VOLUME ASSESSMENT:  No peripheral edema  There is evidence of disturbance of effective circulating volume based on persistent hypotension    HEMATOLOGIC:  CBC results shows no abnormalities  Coag panel shows no abnormalities  DVT prophylaxis with HSQ                        15.1   8.3   )-----------( 157      ( 07 Dec 2018 21:15 )             44.1     PT/INR - ( 07 Dec 2018 21:15 )   PT: 10.7 sec;   INR: 0.94 ratio         PTT - ( 07 Dec 2018 21:15 )  PTT:29.7 sec    INFECTIOUS DISEASE:  Sepsis: Cough, WBC+, hypotension, tachycardia+ Xray RLL PNA  Bcx x 2, UA, RVP  CTX and Azithro to cover for CAP first dose 12/8    HEMODYNAMICS:  Patient is on pressors likely 2/2 to hypovolemic shock. Will use IVF and vasopressin to maintain MAP of 65 or greater    CARDIOVASCULAR:  Problem: Takatsubo's Stress Cardiomyopathy / HFrEF  Assessment: p/w 2 week history of intermittent L sided chest pain. ECG concerning for an anterior STEMI however patient was almost chest pain free upon arrival. S/p ASA load, clopidogrel, HSQ 5000, and was taken emergently to the cath lab. The LHC showed only mid LAD 40 % stenosis. Ventriculogram showed severe anterolateral hypokinesis, severe apical hypokinesis, and severe diaphragmatic hypokinesis. EF estimated was 30 %.  Plan: TTE ordered to eval for presence of LVOT obstruction and further evaluate degree of apical dilatation / cardiac function. Repeat TTE after discharge.     Problem: HFrEF  Assessment: May be acute/temporary in setting of Takatsubo's. Patient did not have dx of HF prior to two weeks ago reports she had an echo several months ago that was "ok"  Plan: Hold metoprolol tart 12.5 BID in setting of hypotension/sepsis. Resume lisinopril 10 (home med for HTN). Uptitrate as tolerated. Will consider decreasing/stopping these medications after discharge if EF improves.       C/w Atorvastatin 10 instead of pravastatin 40 (home med)    RESPIRATORY:  On NC  PNA txt as above

## 2018-12-08 NOTE — H&P ADULT - NSHPSOCIALHISTORY_GEN_ALL_CORE
Lives at home  Currently planning on redoing her kitchen  Highly functional. Ambulates and does all of her ADLs. Goes to [employer] regularly. 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

## 2018-12-08 NOTE — H&P ADULT - NSHPLABSRESULTS_GEN_ALL_CORE
CBC Full  -  ( 07 Dec 2018 21:15 )  WBC Count : 8.3 K/uL  Hemoglobin : 15.1 g/dL  Hematocrit : 44.1 %  Platelet Count - Automated : 157 K/uL  Mean Cell Volume : 88.5 fl  Mean Cell Hemoglobin : 30.4 pg  Mean Cell Hemoglobin Concentration : 34.4 gm/dL  Auto Neutrophil # : x  Auto Lymphocyte # : x  Auto Monocyte # : x  Auto Eosinophil # : x  Auto Basophil # : x  Auto Neutrophil % : x  Auto Lymphocyte % : x  Auto Monocyte % : x  Auto Eosinophil % : x  Auto Basophil % : x    12-07    135  |  100  |  24<H>  ----------------------------<  131<H>  4.2   |  20<L>  |  0.82    Ca    9.9      07 Dec 2018 21:15    LIVER FUNCTIONS - ( 07 Dec 2018 21:15 )  Alb: 3.9 g/dL / Pro: 7.6 g/dL / ALK PHOS: 91 U/L / ALT: 15 U/L / AST: 26 U/L / GGT: x           PT/INR - ( 07 Dec 2018 21:15 )   PT: 10.7 sec;   INR: 0.94 ratio         PTT - ( 07 Dec 2018 21:15 )  PTT:29.7 sec CBC Full  -  ( 07 Dec 2018 21:15 )  WBC Count : 8.3 K/uL  Hemoglobin : 15.1 g/dL  Hematocrit : 44.1 %  Platelet Count - Automated : 157 K/uL  Mean Cell Volume : 88.5 fl  Mean Cell Hemoglobin : 30.4 pg  Mean Cell Hemoglobin Concentration : 34.4 gm/dL  Auto Neutrophil # : x  Auto Lymphocyte # : x  Auto Monocyte # : x  Auto Eosinophil # : x  Auto Basophil # : x  Auto Neutrophil % : x  Auto Lymphocyte % : x  Auto Monocyte % : x  Auto Eosinophil % : x  Auto Basophil % : x        135  |  100  |  24<H>  ----------------------------<  131<H>  4.2   |  20<L>  |  0.82    Ca    9.9      07 Dec 2018 21:15    LIVER FUNCTIONS - ( 07 Dec 2018 21:15 )  Alb: 3.9 g/dL / Pro: 7.6 g/dL / ALK PHOS: 91 U/L / ALT: 15 U/L / AST: 26 U/L / GGT: x           PT/INR - ( 07 Dec 2018 21:15 )   PT: 10.7 sec;   INR: 0.94 ratio         PTT - ( 07 Dec 2018 21:15 )  PTT:29.7 sec    INDICATIONS: Precordial pain.  HISTORY: There was no prior cardiac history. The patient has hypertension  and medication-treated dyslipidemia.  PROCEDURE:  --  Left heart catheterization with ventriculography.  --  Left coronary angiography.  --  Right coronary angiography.  --  Sonosite - Diagnostic.  --  Hemostasis with Angioseal.  TECHNIQUE: The risks and alternatives of the procedures and conscious  sedation were explained to the patient and informed consent was obtained.  Cardiac catheterization performed emergently.  Local anesthetic given. Right femoral artery access. A 6FR SHEATH PINNACLE  was inserted in the vessel. Left heart catheterization. Ventriculography  was performed. A 5FR  EXPO catheter was utilized. Left coronary  artery angiography. The vessel was injected utilizing a 5FR FL4.0 EXPO  catheter. Right coronary artery angiography. The vessel was injected  utilizing a 5FR FR4.0 EXPO catheter. Sonosite - Diagnostic. Hemostasis  with Angioseal. RADIATION EXPOSURE: 2.5 min.  CONTRAST GIVEN: Omnipaque 60 ml.  MEDICATIONS GIVEN: Lasix (Furosemide), 20 mg, IV.  VENTRICLES: Analysis of regional contractile function demonstrated severe  anterolateral hypokinesis, severe apical hypokinesis, and severe  diaphragmatic hypokinesis. EF estimated was 30 %.  CORONARY VESSELS: The coronary circulation is right dominant.  LM:   --  LM: Angiography showed mild atherosclerosis with no flow limiting  lesions.  LAD:   --  Mid LAD: There was a 40 % stenosis.  CX:   --  Circumflex: Angiography showed minor luminal irregularities with  no flow limiting lesions.  RCA:   --  RCA: Angiography showed minor luminal irregularities with no  flow limiting lesions.  COMPLICATIONS: There were no complications.  SUMMARY:  CORONARY VESSELS: LM: Angiography showed mild atherosclerosis with no flow  limiting lesions. Mid LAD: There was a 40 % stenosis. Circumflex:  Angiography showed minor luminal irregularities with no flow limiting  lesions.  DIAGNOSTIC RECOMMENDATIONS: IV lasix, optimize medications  Prepared and signed by  Carmen Garner M.D.  Signed 2018 22:14:54  HEMODYNAMIC TABLES  Pressures:  Baseline  Pressures:  - HR: 113  Pressures:  - Rhythm:  Pressures:  -- Aortic Pressure (S/D/M): 143/88/114  Outputs:  Baseline  Outputs:  -- CALCULATIONS[age]  Outputs:  -- CALCULATIONS: Body Surface Area: 1.41  Outputs:  -- CALCULATIONS: Height in cm: 150.00  Outputs:  -- CALCULATIONS: Sex: Female  Outputs:  -- CALCULATIONS: Weight in k.50

## 2018-12-08 NOTE — H&P ADULT - NSHPPHYSICALEXAM_GEN_ALL_CORE
PHYSICAL EXAM:   GEN: Age appropriate, resting comfortably in bed, no acute distress, non toxic appearing, speaking in complete sentences.   HEENT: Conjunctiva and sclera normal  PULM: Lungs CTAB, no wheezes, rales, rhonchi  CV: RRR, S1S2, no MRG  Abdominal: Soft, nontender to palpation, non-distended, +BS  Extremities: No edema or cyanosis  NEURO: AAOx3  Skin: No rashes, lesions PHYSICAL EXAM:   GEN: Age appropriate, resting comfortably in bed, no acute distress, non toxic appearing, speaking in complete sentences.   HEENT: Conjunctiva and sclera normal  PULM: Lungs CTAB, no wheezes, rales, rhonchi  CV: RRR, S1S2, no MRG  Abdominal: Soft, nontender to palpation, non-distended, +BS  Extremities: No edema or cyanosis. No swelling, TTP, or bleeding from RLE cath site.   NEURO: AAOx3  Skin: No rashes, lesions

## 2018-12-08 NOTE — CHART NOTE - NSCHARTNOTEFT_GEN_A_CORE
====================  CCU MIDNIGHT ROUNDS  ====================    [facility]  [id number]  Patient is a 87y old  Female who presents with a chief complaint of Chest pain (08 Dec 2018 06:35)      ====================  SUMMARY: 87F with a history of HTN, HLD, breast CA (s/p mastectomy) who presents with a 2 week history of intermittent L sided chest pain. In the ED, the ECG concerning for an anterior STEMI however patient was almost chest pain free upon arrival. There was concern that it could be a late presentation STEMI. The patient received ASA load, clopidogrel, HSQ 5000, and was taken emergently to the cath lab. The LHC did not demonstrate any clinically relevant obstruction. Official cath report pending. Patient admitted to CCU. 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  ====================      ====================  NEW EVENTS: Off vasopressin since 5PM tolerating well, maintain BP.   ====================    MEDICATIONS  (STANDING):  aspirin enteric coated 81 milliGRAM(s) Oral daily  atorvastatin 10 milliGRAM(s) Oral at bedtime  azithromycin  IVPB      cefTRIAXone   IVPB      chlorhexidine 4% Liquid 1 Application(s) Topical <User Schedule>  heparin  Injectable 5000 Unit(s) SubCutaneous every 12 hours  vasopressin Infusion 0.04 Unit(s)/Min (2.4 mL/Hr) IV Continuous <Continuous>    MEDICATIONS  (PRN):      ====================  VITALS (Last 12 hrs):  ====================    T(C): 36.6 (12-08-18 @ 19:00), Max: 36.6 (12-08-18 @ 19:00)  T(F): 97.8 (12-08-18 @ 19:00), Max: 97.8 (12-08-18 @ 19:00)  HR: 84 (12-08-18 @ 21:15) (68 - 84)  BP: 90/48 (12-08-18 @ 21:15) (66/47 - 98/60)  BP(mean): 62 (12-08-18 @ 21:15) (51 - 75)  ABP: --  ABP(mean): --  RR: 16 (12-08-18 @ 18:00) (16 - 19)  SpO2: 94% (12-08-18 @ 21:15) (91% - 98%)      I&O's Summary    07 Dec 2018 07:01  -  08 Dec 2018 07:00  --------------------------------------------------------  IN: 590 mL / OUT: 850 mL / NET: -260 mL    08 Dec 2018 07:01  -  08 Dec 2018 23:11  --------------------------------------------------------  IN: 316.8 mL / OUT: 100 mL / NET: 216.8 mL            ====================  NEW LABS:  ====================      12-08    131<L>  |  96  |  28<H>  ----------------------------<  111<H>  3.8   |  22  |  0.96    Ca    8.7      08 Dec 2018 22:10  Phos  3.2     12-08  Mg     1.8     12-08    TPro  7.6  /  Alb  3.9  /  TBili  0.3  /  DBili  x   /  AST  26  /  ALT  15  /  AlkPhos  91  12-07    PT/INR - ( 08 Dec 2018 03:48 )   PT: 10.9 sec;   INR: 0.96 ratio         PTT - ( 08 Dec 2018 03:48 )  PTT:28.8 sec  Creatine Kinase, Serum: 205 U/L <H> (12-08-18 @ 22:10)  Creatine Kinase, Serum: 204 U/L <H> (12-08-18 @ 15:56)  Creatine Kinase, Serum: 250 U/L <H> (12-08-18 @ 08:56)  Creatine Kinase, Serum: 273 U/L <H> (12-08-18 @ 03:48)    CKMB Units: 10.6 ng/mL (12-08 @ 22:10)  CKMB Units: 14.4 ng/mL (12-08 @ 15:56)  CKMB Units: 24.8 ng/mL (12-08 @ 08:56)  CKMB Units: 33.2 ng/mL (12-08 @ 03:48)        ====================  PLAN:  ====================  Takatsubo Cardiomyopathy/HFrEF  -Continue ASA, low dose lipitor  -Strict I+O, daily weights    CAP  -Continue Abx treatment  - (-) RSV, f/u BC      Carolina Martinez CCU NP  Beeper #[id number]  Spectra # [phone]

## 2018-12-08 NOTE — H&P ADULT - NSHPREVIEWOFSYSTEMS_GEN_ALL_CORE
Constitutional: denies fevers, chills, night sweats, weight loss  HEENT: denies visual changes, hearing changes, rhinitis, odynophagia, or dysphagia  Cardiovascular: denies palpitations, chest pain, edema  Respiratory: denies SOB, wheezing  Gastrointestinal: denies N/V/D, abdominal pain, hematochezia, melena  : denies dysuria, hematuria  MSK: denies weakness, joint pain  Skin: denies new rashes or masses  Heme: denies bleeding  Neuro: denies headache

## 2018-12-08 NOTE — H&P ADULT - HISTORY OF PRESENT ILLNESS
The patient is an 87F with a history of HTN, HLD, breast CA (s/p mastectomy) who presents with a 2 week history of intermittent L sided chest pain. In the ED, the ECG concerning for an anterior STEMI however patient was almost chest pain free upon arrival. There was concern that it could be a late presentation STEMI. The patient received ASA load, clopidogrel, HSQ 5000, and was taken emergently to the cath lab. The LHC did not demonstrate any clinically relevant obstruction. Official cath report pending. Patient admitted to CCU. 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 The patient reports she is redoing her kitchen which is causing her to be stressed out. Patient reports the pain was intermittent, would last for a short time, and would sometimes be as bad as 7/10. She denies exacerbating or relieving symptoms. She reports she has been belching more often recently however denies and sob, diaphoresis, nausea, or vomiting.     She reports that she does have chronic, left chest discomfort which she believes is related to her left breast mastectomy She had an US and an MRI of her left chest a few years ago to evaluate the mastectomy and reports there were no issues found.     She reports that 15-20 years ago she had a "small cardiac arrest." She does not believe she had a cath at that time but does not remember clearly. She had an echo a few months ago which she and her daughters say was normal. She has an appointment to see a cardiologist in February. She sees her PCP regularly.     Home meds include: ASA 81, Pravastatin 40, Lisinopril 10.     She endorses allergies to PCN (rash/fever) and sulfa drugs     PCP Dr. Delicia Jarvis [facility]. The patient is an 87F with a history of HTN, HLD, breast CA (s/p mastectomy) who presents with a 2 week history of intermittent L sided chest pain. In the ED, the ECG concerning for an anterior STEMI however patient was almost chest pain free upon arrival. There was concern that it could be a late presentation STEMI. The patient received ASA load, clopidogrel, HSQ 5000, and was taken emergently to the cath lab. The LHC did not demonstrate any clinically relevant obstruction. Official cath report pending. Patient admitted to CCU. 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 The patient reports she is redoing her kitchen which is causing her to be stressed out. Patient reports the pain was intermittent, would last for a short time, and would sometimes be as bad as 7/10. She denies exacerbating or relieving symptoms. She reports she has been belching more often recently however denies and sob, diaphoresis, nausea, or vomiting.       She reports that she does have chronic, left chest discomfort which she believes is related to her left breast mastectomy She had an US and an MRI of her left chest a few years ago to evaluate the mastectomy and reports there were no issues found.     She reports that 15-20 years ago she had a "small cardiac arrest." She does not believe she had a cath at that time but does not remember clearly. She had an echo a few months ago which she and her daughters say was normal. She has an appointment to see a cardiologist in February. She sees her PCP regularly.     Home meds include: ASA 81, Pravastatin 40, Lisinopril 10.     She endorses allergies to PCN (rash/fever) and sulfa drugs     PCP Dr. Delicia Jarvis [facility].

## 2018-12-09 LAB
ANION GAP SERPL CALC-SCNC: 13 MMOL/L — SIGNIFICANT CHANGE UP (ref 5–17)
APPEARANCE UR: CLEAR — SIGNIFICANT CHANGE UP
BILIRUB UR-MCNC: NEGATIVE — SIGNIFICANT CHANGE UP
BUN SERPL-MCNC: 22 MG/DL — SIGNIFICANT CHANGE UP (ref 7–23)
CALCIUM SERPL-MCNC: 8.3 MG/DL — LOW (ref 8.4–10.5)
CHLORIDE SERPL-SCNC: 98 MMOL/L — SIGNIFICANT CHANGE UP (ref 96–108)
CO2 SERPL-SCNC: 21 MMOL/L — LOW (ref 22–31)
COLOR SPEC: SIGNIFICANT CHANGE UP
CREAT SERPL-MCNC: 0.82 MG/DL — SIGNIFICANT CHANGE UP (ref 0.5–1.3)
DIFF PNL FLD: NEGATIVE — SIGNIFICANT CHANGE UP
GLUCOSE SERPL-MCNC: 109 MG/DL — HIGH (ref 70–99)
GLUCOSE UR QL: NEGATIVE — SIGNIFICANT CHANGE UP
HCT VFR BLD CALC: 33.3 % — LOW (ref 34.5–45)
HGB BLD-MCNC: 11.6 G/DL — SIGNIFICANT CHANGE UP (ref 11.5–15.5)
KETONES UR-MCNC: NEGATIVE — SIGNIFICANT CHANGE UP
LEUKOCYTE ESTERASE UR-ACNC: NEGATIVE — SIGNIFICANT CHANGE UP
MAGNESIUM SERPL-MCNC: 2.1 MG/DL — SIGNIFICANT CHANGE UP (ref 1.6–2.6)
MCHC RBC-ENTMCNC: 30.8 PG — SIGNIFICANT CHANGE UP (ref 27–34)
MCHC RBC-ENTMCNC: 34.8 GM/DL — SIGNIFICANT CHANGE UP (ref 32–36)
MCV RBC AUTO: 88.4 FL — SIGNIFICANT CHANGE UP (ref 80–100)
NITRITE UR-MCNC: NEGATIVE — SIGNIFICANT CHANGE UP
PH UR: 6 — SIGNIFICANT CHANGE UP (ref 5–8)
PHOSPHATE SERPL-MCNC: 2.7 MG/DL — SIGNIFICANT CHANGE UP (ref 2.5–4.5)
PLATELET # BLD AUTO: 131 K/UL — LOW (ref 150–400)
POTASSIUM SERPL-MCNC: 4.3 MMOL/L — SIGNIFICANT CHANGE UP (ref 3.5–5.3)
POTASSIUM SERPL-SCNC: 4.3 MMOL/L — SIGNIFICANT CHANGE UP (ref 3.5–5.3)
PROCALCITONIN SERPL-MCNC: 0.08 NG/ML — SIGNIFICANT CHANGE UP (ref 0.02–0.1)
PROT UR-MCNC: NEGATIVE — SIGNIFICANT CHANGE UP
RBC # BLD: 3.77 M/UL — LOW (ref 3.8–5.2)
RBC # FLD: 12.7 % — SIGNIFICANT CHANGE UP (ref 10.3–14.5)
SODIUM SERPL-SCNC: 132 MMOL/L — LOW (ref 135–145)
SP GR SPEC: 1.02 — SIGNIFICANT CHANGE UP (ref 1.01–1.02)
UROBILINOGEN FLD QL: NEGATIVE — SIGNIFICANT CHANGE UP
WBC # BLD: 9 K/UL — SIGNIFICANT CHANGE UP (ref 3.8–10.5)
WBC # FLD AUTO: 9 K/UL — SIGNIFICANT CHANGE UP (ref 3.8–10.5)

## 2018-12-09 PROCEDURE — 99291 CRITICAL CARE FIRST HOUR: CPT

## 2018-12-09 RX ORDER — METOPROLOL TARTRATE 50 MG
12.5 TABLET ORAL ONCE
Qty: 0 | Refills: 0 | Status: COMPLETED | OUTPATIENT
Start: 2018-12-09 | End: 2018-12-09

## 2018-12-09 RX ORDER — METOPROLOL TARTRATE 50 MG
12.5 TABLET ORAL
Qty: 0 | Refills: 0 | Status: DISCONTINUED | OUTPATIENT
Start: 2018-12-09 | End: 2018-12-09

## 2018-12-09 RX ORDER — METOPROLOL TARTRATE 50 MG
12.5 TABLET ORAL
Qty: 0 | Refills: 0 | Status: DISCONTINUED | OUTPATIENT
Start: 2018-12-09 | End: 2018-12-10

## 2018-12-09 RX ADMIN — HEPARIN SODIUM 5000 UNIT(S): 5000 INJECTION INTRAVENOUS; SUBCUTANEOUS at 17:39

## 2018-12-09 RX ADMIN — CHLORHEXIDINE GLUCONATE 1 APPLICATION(S): 213 SOLUTION TOPICAL at 05:31

## 2018-12-09 RX ADMIN — CEFTRIAXONE 100 GRAM(S): 500 INJECTION, POWDER, FOR SOLUTION INTRAMUSCULAR; INTRAVENOUS at 08:47

## 2018-12-09 RX ADMIN — HEPARIN SODIUM 5000 UNIT(S): 5000 INJECTION INTRAVENOUS; SUBCUTANEOUS at 05:15

## 2018-12-09 RX ADMIN — AZITHROMYCIN 250 MILLIGRAM(S): 500 TABLET, FILM COATED ORAL at 08:47

## 2018-12-09 RX ADMIN — Medication 12.5 MILLIGRAM(S): at 16:22

## 2018-12-09 RX ADMIN — ATORVASTATIN CALCIUM 10 MILLIGRAM(S): 80 TABLET, FILM COATED ORAL at 21:14

## 2018-12-09 RX ADMIN — Medication 81 MILLIGRAM(S): at 11:22

## 2018-12-09 NOTE — CHART NOTE - NSCHARTNOTEFT_GEN_A_CORE
====================  CCU MIDNIGHT ROUNDS  ====================    [facility]  [id number]  Patient is a 87y old  Female who presents with a chief complaint of Chest pain (09 Dec 2018 07:46)      ====================  SUMMARY: 87F with a history of HTN, HLD, breast CA (s/p mastectomy) who presents with a 2 week history of intermittent L sided chest pain. In the ED, the ECG concerning for an anterior STEMI however patient was almost chest pain free upon arrival. There was concern that it could be a late presentation STEMI. The patient received ASA load, clopidogrel, HSQ 5000, and was taken emergently to the cath lab. The LHC did not demonstrate any clinically relevant obstruction. Official cath report pending. Patient admitted to CCU. 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  ====================      ====================  NEW EVENTS: No overnight issues, no acute distress noted.   ====================    MEDICATIONS  (STANDING):  aspirin enteric coated 81 milliGRAM(s) Oral daily  atorvastatin 10 milliGRAM(s) Oral at bedtime  chlorhexidine 4% Liquid 1 Application(s) Topical <User Schedule>  heparin  Injectable 5000 Unit(s) SubCutaneous every 12 hours  metoprolol tartrate 12.5 milliGRAM(s) Oral two times a day    MEDICATIONS  (PRN):      ====================  VITALS (Last 12 hrs):  ====================    T(C): 36.6 (12-09-18 @ 19:00), Max: 36.6 (12-09-18 @ 19:00)  T(F): 97.8 (12-09-18 @ 19:00), Max: 97.8 (12-09-18 @ 19:00)  HR: 68 (12-09-18 @ 22:00) (66 - 84)  BP: 100/51 (12-09-18 @ 22:00) (81/35 - 100/51)  BP(mean): 73 (12-09-18 @ 22:00) (47 - 73)  ABP: --  ABP(mean): --  RR: 27 (12-09-18 @ 22:00) (15 - 27)  SpO2: 95% (12-09-18 @ 21:15) (94% - 98%)      I&O's Summary    08 Dec 2018 07:01  -  09 Dec 2018 07:00  --------------------------------------------------------  IN: 516.8 mL / OUT: 750 mL / NET: -233.2 mL    09 Dec 2018 07:01  -  09 Dec 2018 22:33  --------------------------------------------------------  IN: 897 mL / OUT: 800 mL / NET: 97 mL        ====================  NEW LABS:  ====================      12-09    132<L>  |  98  |  22  ----------------------------<  109<H>  4.3   |  21<L>  |  0.82    Ca    8.3<L>      09 Dec 2018 05:29  Phos  2.7     12-09  Mg     2.1     12-09      PT/INR - ( 08 Dec 2018 03:48 )   PT: 10.9 sec;   INR: 0.96 ratio         PTT - ( 08 Dec 2018 03:48 )  PTT:28.8 sec        ====================  PLAN:  ====================  Takatsubo Cardiomyopathy/HFrEF  -Continue ASA, low dose lipitor  -Continue lopressor  -Start ACE as tolerated  -Strict I+O, daily weights    CAP  -D/C Abx treatment  - (-) RSV, (-)procalcitonin, (-) BC       Carolina Martinez CCU NP  Beeper #[id number]  Spectra # [phone]

## 2018-12-09 NOTE — PROGRESS NOTE ADULT - SUBJECTIVE AND OBJECTIVE BOX
Patient is a 87y old  Female who presents with a chief complaint of Chest pain (08 Dec 2018 06:35)      Jose Dalton  EM/IM PGY1    SUBJECTIVE / OVERNIGHT EVENTS:    MEDICATIONS  (STANDING):  aspirin enteric coated 81 milliGRAM(s) Oral daily  atorvastatin 10 milliGRAM(s) Oral at bedtime  azithromycin  IVPB 500 milliGRAM(s) IV Intermittent every 24 hours  azithromycin  IVPB      cefTRIAXone   IVPB      cefTRIAXone   IVPB 1 Gram(s) IV Intermittent every 24 hours  chlorhexidine 4% Liquid 1 Application(s) Topical <User Schedule>  heparin  Injectable 5000 Unit(s) SubCutaneous every 12 hours    MEDICATIONS  (PRN):      Vital Signs Last 24 Hrs  T(C): 36.9 (09 Dec 2018 05:00), Max: 36.9 (08 Dec 2018 22:15)  T(F): 98.4 (09 Dec 2018 05:00), Max: 98.5 (08 Dec 2018 22:15)  HR: 84 (09 Dec 2018 06:00) (64 - 84)  BP: 103/56 (09 Dec 2018 06:00) (66/47 - 118/59)  BP(mean): 72 (09 Dec 2018 06:00) (51 - 82)  RR: 29 (09 Dec 2018 06:00) (12 - 29)  SpO2: 95% (09 Dec 2018 06:00) (88% - 98%)  CAPILLARY BLOOD GLUCOSE        I&O's Summary    08 Dec 2018 07:01  -  09 Dec 2018 07:00  --------------------------------------------------------  IN: 516.8 mL / OUT: 750 mL / NET: -233.2 mL        PHYSICAL EXAM:  GENERAL: NAD, well-developed  HEAD:  Atraumatic, Normocephalic  EYES: EOMI, PERRLA, conjunctiva and sclera clear  NECK: Supple, No JVD  CHEST/LUNG: Clear to auscultation bilaterally; No wheeze  HEART: Regular rate and rhythm; No murmurs, rubs, or gallops  ABDOMEN: Soft, Nontender, Nondistended; Bowel sounds present  EXTREMITIES:  2+ Peripheral Pulses, No clubbing, cyanosis, or edema  PSYCH: AAOx3  NEUROLOGY: non-focal  SKIN: No rashes or lesions    LABS:                        11.6   9.0   )-----------( 131      ( 09 Dec 2018 05:29 )             33.3         132<L>  |  98  |  22  ----------------------------<  109<H>  4.3   |  21<L>  |  0.82    Ca    8.3<L>      09 Dec 2018 05:29  Phos  2.7       Mg     2.1         TPro  7.6  /  Alb  3.9  /  TBili  0.3  /  DBili  x   /  AST  26  /  ALT  15  /  AlkPhos  91  12    PT/INR - ( 08 Dec 2018 03:48 )   PT: 10.9 sec;   INR: 0.96 ratio         PTT - ( 08 Dec 2018 03:48 )  PTT:28.8 sec  CARDIAC MARKERS ( 08 Dec 2018 22:10 )  x     / x     / 205 U/L / x     / 10.6 ng/mL  CARDIAC MARKERS ( 08 Dec 2018 15:56 )  x     / x     / 204 U/L / x     / 14.4 ng/mL  CARDIAC MARKERS ( 08 Dec 2018 08:56 )  x     / x     / 250 U/L / x     / 24.8 ng/mL  CARDIAC MARKERS ( 08 Dec 2018 03:48 )  x     / x     / 273 U/L / x     / 33.2 ng/mL      Urinalysis Basic - ( 09 Dec 2018 05:43 )    Color: Light Yellow / Appearance: Clear / S.016 / pH: x  Gluc: x / Ketone: Negative  / Bili: Negative / Urobili: Negative   Blood: x / Protein: Negative / Nitrite: Negative   Leuk Esterase: Negative / RBC: x / WBC x   Sq Epi: x / Non Sq Epi: x / Bacteria: x Patient is a 87y old  Female who presents with a chief complaint of Chest pain (08 Dec 2018 06:35)      Jose Dalton  EM/IM PGY1    SUBJECTIVE / OVERNIGHT EVENTS: No acute events overnight. Pt now off pressors, but BP soft. Pt denies any complaints at this time.    MEDICATIONS  (STANDING):  aspirin enteric coated 81 milliGRAM(s) Oral daily  atorvastatin 10 milliGRAM(s) Oral at bedtime  azithromycin  IVPB 500 milliGRAM(s) IV Intermittent every 24 hours  azithromycin  IVPB      cefTRIAXone   IVPB      cefTRIAXone   IVPB 1 Gram(s) IV Intermittent every 24 hours  chlorhexidine 4% Liquid 1 Application(s) Topical <User Schedule>  heparin  Injectable 5000 Unit(s) SubCutaneous every 12 hours    MEDICATIONS  (PRN):      Vital Signs Last 24 Hrs  T(C): 36.9 (09 Dec 2018 05:00), Max: 36.9 (08 Dec 2018 22:15)  T(F): 98.4 (09 Dec 2018 05:00), Max: 98.5 (08 Dec 2018 22:15)  HR: 84 (09 Dec 2018 06:00) (64 - 84)  BP: 103/56 (09 Dec 2018 06:00) (66/47 - 118/59)  BP(mean): 72 (09 Dec 2018 06:00) (51 - 82)  RR: 29 (09 Dec 2018 06:00) (12 - 29)  SpO2: 95% (09 Dec 2018 06:00) (88% - 98%)  CAPILLARY BLOOD GLUCOSE        I&O's Summary    08 Dec 2018 07:01  -  09 Dec 2018 07:00  --------------------------------------------------------  IN: 516.8 mL / OUT: 750 mL / NET: -233.2 mL        PHYSICAL EXAM:  GENERAL: NAD, well-developed  HEAD:  Atraumatic, Normocephalic  EYES: EOMI, PERRLA, conjunctiva and sclera clear  NECK: Supple, No JVD  CHEST/LUNG: Clear to auscultation bilaterally; No wheeze  HEART: Regular rate and rhythm; No murmurs, rubs, or gallops  ABDOMEN: Soft, Nontender, Nondistended; Bowel sounds present  EXTREMITIES:  2+ Peripheral Pulses, No clubbing, cyanosis, or edema  PSYCH: AAOx3  NEUROLOGY: non-focal  SKIN: No rashes or lesions    LABS:                        11.6   9.0   )-----------( 131      ( 09 Dec 2018 05:29 )             33.3         132<L>  |  98  |  22  ----------------------------<  109<H>  4.3   |  21<L>  |  0.82    Ca    8.3<L>      09 Dec 2018 05:29  Phos  2.7       Mg     2.1         TPro  7.6  /  Alb  3.9  /  TBili  0.3  /  DBili  x   /  AST  26  /  ALT  15  /  AlkPhos  91  12-    PT/INR - ( 08 Dec 2018 03:48 )   PT: 10.9 sec;   INR: 0.96 ratio         PTT - ( 08 Dec 2018 03:48 )  PTT:28.8 sec  CARDIAC MARKERS ( 08 Dec 2018 22:10 )  x     / x     / 205 U/L / x     / 10.6 ng/mL  CARDIAC MARKERS ( 08 Dec 2018 15:56 )  x     / x     / 204 U/L / x     / 14.4 ng/mL  CARDIAC MARKERS ( 08 Dec 2018 08:56 )  x     / x     / 250 U/L / x     / 24.8 ng/mL  CARDIAC MARKERS ( 08 Dec 2018 03:48 )  x     / x     / 273 U/L / x     / 33.2 ng/mL      Urinalysis Basic - ( 09 Dec 2018 05:43 )    Color: Light Yellow / Appearance: Clear / S.016 / pH: x  Gluc: x / Ketone: Negative  / Bili: Negative / Urobili: Negative   Blood: x / Protein: Negative / Nitrite: Negative   Leuk Esterase: Negative / RBC: x / WBC x   Sq Epi: x / Non Sq Epi: x / Bacteria: x Patient is a 87y old  Female who presents with a chief complaint of Chest pain (08 Dec 2018 06:35)      Jose Dalton  EM/IM PGY1    SUBJECTIVE / OVERNIGHT EVENTS: No acute events overnight. Pt now off pressors, but BP soft. Pt denies any complaints at this time.    MEDICATIONS  (STANDING):  aspirin enteric coated 81 milliGRAM(s) Oral daily  atorvastatin 10 milliGRAM(s) Oral at bedtime  azithromycin  IVPB 500 milliGRAM(s) IV Intermittent every 24 hours  azithromycin  IVPB      cefTRIAXone   IVPB      cefTRIAXone   IVPB 1 Gram(s) IV Intermittent every 24 hours  chlorhexidine 4% Liquid 1 Application(s) Topical <User Schedule>  heparin  Injectable 5000 Unit(s) SubCutaneous every 12 hours    MEDICATIONS  (PRN):      Vital Signs Last 24 Hrs  T(C): 36.9 (09 Dec 2018 05:00), Max: 36.9 (08 Dec 2018 22:15)  T(F): 98.4 (09 Dec 2018 05:00), Max: 98.5 (08 Dec 2018 22:15)  HR: 84 (09 Dec 2018 06:00) (64 - 84)  BP: 103/56 (09 Dec 2018 06:00) (66/47 - 118/59)  BP(mean): 72 (09 Dec 2018 06:00) (51 - 82)  RR: 29 (09 Dec 2018 06:00) (12 - 29)  SpO2: 95% (09 Dec 2018 06:00) (88% - 98%)  CAPILLARY BLOOD GLUCOSE        I&O's Summary    08 Dec 2018 07:01  -  09 Dec 2018 07:00  --------------------------------------------------------  IN: 516.8 mL / OUT: 750 mL / NET: -233.2 mL        PHYSICAL EXAM:  GEN: Resting comfortably in bed, no acute distress, non toxic appearing, speaking in complete sentences.   HEENT: Conjunctiva and sclera normal  PULM: Lungs CTAB, no wheezes, rales, rhonchi  CV: RRR, nl S1/S2  Abdominal: Soft, nontender to palpation, non-distended, normoactive bowel sounds  Extremities: No edema or cyanosis. Right groin site not swollen or TTP.  NEURO: AAOx3  Skin: No rashes, lesions    LABS:                        11.6   9.0   )-----------( 131      ( 09 Dec 2018 05:29 )             33.3         132<L>  |  98  |  22  ----------------------------<  109<H>  4.3   |  21<L>  |  0.82    Ca    8.3<L>      09 Dec 2018 05:29  Phos  2.7       Mg     2.1         TPro  7.6  /  Alb  3.9  /  TBili  0.3  /  DBili  x   /  AST  26  /  ALT  15  /  AlkPhos  91  12    PT/INR - ( 08 Dec 2018 03:48 )   PT: 10.9 sec;   INR: 0.96 ratio         PTT - ( 08 Dec 2018 03:48 )  PTT:28.8 sec  CARDIAC MARKERS ( 08 Dec 2018 22:10 )  x     / x     / 205 U/L / x     / 10.6 ng/mL  CARDIAC MARKERS ( 08 Dec 2018 15:56 )  x     / x     / 204 U/L / x     / 14.4 ng/mL  CARDIAC MARKERS ( 08 Dec 2018 08:56 )  x     / x     / 250 U/L / x     / 24.8 ng/mL  CARDIAC MARKERS ( 08 Dec 2018 03:48 )  x     / x     / 273 U/L / x     / 33.2 ng/mL      Urinalysis Basic - ( 09 Dec 2018 05:43 )    Color: Light Yellow / Appearance: Clear / S.016 / pH: x  Gluc: x / Ketone: Negative  / Bili: Negative / Urobili: Negative   Blood: x / Protein: Negative / Nitrite: Negative   Leuk Esterase: Negative / RBC: x / WBC x   Sq Epi: x / Non Sq Epi: x / Bacteria: x

## 2018-12-09 NOTE — PROGRESS NOTE ADULT - ASSESSMENT
The patient is an 87F with a history of HTN, HLD, breast CA (s/p mastectomy) who presents with a 2 week history of intermittent L sided chest pain. In the ED, the ECG concerning for an anterior STEMI however patient was almost chest pain free upon arrival. There was concern that it could be a late presentation STEMI. The patient received ASA load, clopidogrel, HSQ 5000, and was taken emergently to the cath lab. The LHC did not demonstrate any clinically relevant obstruction. 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 Hospital course c/b CAP (prod cough, hypotension, tachycardia, WBC, RLL consolidation).      NEUROLOGIC:  No alteration of mental status present. The patient is eligible for early mobilization and immediate physical therapy. PT consult ordered.    SKIN:  Lines: PIVs  Decubiti: None    GI:  Diet: regular DASH TLC  GI stress prophylaxis not indicated    METABOLIC:  BMP showing no abnormalities  Liver functions tests showing no abnormalities   Endocrine no abnormalities   12-07    135  |  100  |  24<H>  ----------------------------<  131<H>  4.2   |  20<L>  |  0.82    Ca    9.9      07 Dec 2018 21:15    TPro  7.6  /  Alb  3.9  /  TBili  0.3  /  DBili  x   /  AST  26  /  ALT  15  /  AlkPhos  91  12-07      VOLUME ASSESSMENT:  No peripheral edema  There is evidence of disturbance of effective circulating volume based on persistent hypotension    HEMATOLOGIC:  CBC results shows no abnormalities  Coag panel shows no abnormalities  DVT prophylaxis with HSQ                        15.1   8.3   )-----------( 157      ( 07 Dec 2018 21:15 )             44.1     PT/INR - ( 07 Dec 2018 21:15 )   PT: 10.7 sec;   INR: 0.94 ratio         PTT - ( 07 Dec 2018 21:15 )  PTT:29.7 sec    INFECTIOUS DISEASE:  Sepsis: Cough, WBC+, hypotension, tachycardia+ Xray RLL PNA  Bcx x 2, UA, RVP  CTX and Azithro to cover for CAP first dose 12/8    HEMODYNAMICS:  Patient is on pressors likely 2/2 to hypovolemic shock. Will use IVF and vasopressin to maintain MAP of 65 or greater    CARDIOVASCULAR:  Problem: Takatsubo's Stress Cardiomyopathy / HFrEF  Assessment: p/w 2 week history of intermittent L sided chest pain. ECG concerning for an anterior STEMI however patient was almost chest pain free upon arrival. S/p ASA load, clopidogrel, HSQ 5000, and was taken emergently to the cath lab. The LHC showed only mid LAD 40 % stenosis. Ventriculogram showed severe anterolateral hypokinesis, severe apical hypokinesis, and severe diaphragmatic hypokinesis. EF estimated was 30 %.  Plan: TTE ordered to eval for presence of LVOT obstruction and further evaluate degree of apical dilatation / cardiac function. Repeat TTE after discharge.     Problem: HFrEF  Assessment: May be acute/temporary in setting of Takatsubo's. Patient did not have dx of HF prior to two weeks ago reports she had an echo several months ago that was "ok"  Plan: Hold metoprolol tart 12.5 BID in setting of hypotension/sepsis. Resume lisinopril 10 (home med for HTN). Uptitrate as tolerated. Will consider decreasing/stopping these medications after discharge if EF improves.       C/w Atorvastatin 10 instead of pravastatin 40 (home med)    RESPIRATORY:  On NC  PNA txt as above 87F with a history of HTN, HLD, breast CA (s/p mastectomy) who presents with a 2 week history of intermittent L sided chest pain. In the ED, the ECG concerning for an anterior STEMI however patient was almost chest pain free upon arrival. There was concern that it could be a late presentation STEMI. The patient received ASA load, clopidogrel, HSQ 5000, and was taken emergently to the cath lab. The LHC did not demonstrate any clinically relevant obstruction. 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      NEUROLOGIC:  - No active issues    SKIN:  Lines: PIVs  Decubiti: None    GI:  Diet: regular DASH TLC  GI stress prophylaxis not indicated    METABOLIC:  - no active issues    HEMATOLOGIC:  - no active issues    INFECTIOUS DISEASE:  - Was on cef/azithro for possible pna 12/8-12/9, however d/c'd now due to resolution of WBC count, negative blood cx, no active respiratory symptoms, improved BP, negative procalcitonin    HEMODYNAMICS:  - No longer requiring pressors, will restart patient's BB and ACE as tolerated.    CARDIOVASCULAR:  - EF 40% sev seg LVSD, mod DD, nml RVFs  - Now off pressors, will restart metoprolol tart 12.5 BID, will start lisinopril later if BP tolerates  - troponins downtrending  - C/w Atorvastatin 10 instead of pravastatin 40 (home med)    RESPIRATORY:  - O2 sat good on RA  - no active issues

## 2018-12-10 LAB
ANION GAP SERPL CALC-SCNC: 12 MMOL/L — SIGNIFICANT CHANGE UP (ref 5–17)
BUN SERPL-MCNC: 22 MG/DL — SIGNIFICANT CHANGE UP (ref 7–23)
CALCIUM SERPL-MCNC: 8.5 MG/DL — SIGNIFICANT CHANGE UP (ref 8.4–10.5)
CHLORIDE SERPL-SCNC: 104 MMOL/L — SIGNIFICANT CHANGE UP (ref 96–108)
CO2 SERPL-SCNC: 22 MMOL/L — SIGNIFICANT CHANGE UP (ref 22–31)
CREAT SERPL-MCNC: 0.93 MG/DL — SIGNIFICANT CHANGE UP (ref 0.5–1.3)
GLUCOSE SERPL-MCNC: 96 MG/DL — SIGNIFICANT CHANGE UP (ref 70–99)
HCT VFR BLD CALC: 31.6 % — LOW (ref 34.5–45)
HGB BLD-MCNC: 11 G/DL — LOW (ref 11.5–15.5)
MAGNESIUM SERPL-MCNC: 2.2 MG/DL — SIGNIFICANT CHANGE UP (ref 1.6–2.6)
MCHC RBC-ENTMCNC: 31.1 PG — SIGNIFICANT CHANGE UP (ref 27–34)
MCHC RBC-ENTMCNC: 34.9 GM/DL — SIGNIFICANT CHANGE UP (ref 32–36)
MCV RBC AUTO: 89 FL — SIGNIFICANT CHANGE UP (ref 80–100)
PHOSPHATE SERPL-MCNC: 2.4 MG/DL — LOW (ref 2.5–4.5)
PLATELET # BLD AUTO: 122 K/UL — LOW (ref 150–400)
POTASSIUM SERPL-MCNC: 4 MMOL/L — SIGNIFICANT CHANGE UP (ref 3.5–5.3)
POTASSIUM SERPL-SCNC: 4 MMOL/L — SIGNIFICANT CHANGE UP (ref 3.5–5.3)
RBC # BLD: 3.55 M/UL — LOW (ref 3.8–5.2)
RBC # FLD: 12.5 % — SIGNIFICANT CHANGE UP (ref 10.3–14.5)
SODIUM SERPL-SCNC: 138 MMOL/L — SIGNIFICANT CHANGE UP (ref 135–145)
WBC # BLD: 7.5 K/UL — SIGNIFICANT CHANGE UP (ref 3.8–10.5)
WBC # FLD AUTO: 7.5 K/UL — SIGNIFICANT CHANGE UP (ref 3.8–10.5)

## 2018-12-10 PROCEDURE — 99291 CRITICAL CARE FIRST HOUR: CPT

## 2018-12-10 RX ORDER — METOPROLOL TARTRATE 50 MG
12.5 TABLET ORAL DAILY
Qty: 0 | Refills: 0 | Status: DISCONTINUED | OUTPATIENT
Start: 2018-12-11 | End: 2018-12-12

## 2018-12-10 RX ORDER — METOPROLOL TARTRATE 50 MG
12.5 TABLET ORAL ONCE
Qty: 0 | Refills: 0 | Status: COMPLETED | OUTPATIENT
Start: 2018-12-10 | End: 2018-12-10

## 2018-12-10 RX ORDER — SODIUM,POTASSIUM PHOSPHATES 278-250MG
1 POWDER IN PACKET (EA) ORAL
Qty: 0 | Refills: 0 | Status: COMPLETED | OUTPATIENT
Start: 2018-12-10 | End: 2018-12-10

## 2018-12-10 RX ADMIN — CHLORHEXIDINE GLUCONATE 1 APPLICATION(S): 213 SOLUTION TOPICAL at 05:29

## 2018-12-10 RX ADMIN — Medication 12.5 MILLIGRAM(S): at 17:36

## 2018-12-10 RX ADMIN — HEPARIN SODIUM 5000 UNIT(S): 5000 INJECTION INTRAVENOUS; SUBCUTANEOUS at 05:27

## 2018-12-10 RX ADMIN — Medication 1 TABLET(S): at 21:00

## 2018-12-10 RX ADMIN — Medication 1 TABLET(S): at 10:13

## 2018-12-10 RX ADMIN — Medication 81 MILLIGRAM(S): at 12:46

## 2018-12-10 RX ADMIN — Medication 1 TABLET(S): at 12:46

## 2018-12-10 RX ADMIN — HEPARIN SODIUM 5000 UNIT(S): 5000 INJECTION INTRAVENOUS; SUBCUTANEOUS at 17:36

## 2018-12-10 RX ADMIN — Medication 1 TABLET(S): at 17:36

## 2018-12-10 RX ADMIN — ATORVASTATIN CALCIUM 10 MILLIGRAM(S): 80 TABLET, FILM COATED ORAL at 21:00

## 2018-12-10 RX ADMIN — Medication 12.5 MILLIGRAM(S): at 05:27

## 2018-12-10 NOTE — DIETITIAN INITIAL EVALUATION ADULT. - NS AS NUTRI INTERV MEALS SNACK
Continue current diet order of DASH/TLC. Encourage po intake, specifically of protein sources to ensure maintenance of body mass./General/healthful diet Continue current diet order of DASH/TLC. Encourage po intake, specifically of protein sources to ensure maintenance of body mass. Monitor tolerance to diet prescription, po intake, nutritional labs, weights, skin integrity./General/healthful diet

## 2018-12-10 NOTE — DIETITIAN INITIAL EVALUATION ADULT. - OTHER INFO
Pt reports intentional wt loss of 30 pounds (140 pounds 2 years ago), but wt has been stable for last 6 months. Pt reports UBW to be 107 pounds and denies recent weight changes. Pt without food allergies and nausea/vomiting or diarrhea/constipation. Pt reports last BM 12/08. Pt currently on DASH/TLC diet; reports consuming >75% of meals/snacks provided. States consumed oatmeal, fruit cup, 2 hard boiled eggs and juice for breakfast this morning. Pt reports intentional wt loss of 30 pounds (140 pounds 2 years ago), but wt has been stable for last 6 months. Pt reports UBW to be 107 pounds and denies recent weight changes. Noted current wt 104.9 pounds (12/10), weight likely to be stable. Pt without food allergies and nausea/vomiting or diarrhea/constipation. Pt reports last BM 12/08. Pt currently on DASH/TLC diet; reports consuming >75% of meals/snacks provided. States consumed oatmeal, fruit cup, 2 hard boiled eggs and juice for breakfast this morning.

## 2018-12-10 NOTE — PROGRESS NOTE ADULT - SUBJECTIVE AND OBJECTIVE BOX
Patient is a 87y old  Female who presents with a chief complaint of Chest pain (09 Dec 2018 07:46)      Jose Dalton  EM/IM PGY1    SUBJECTIVE / OVERNIGHT EVENTS:    MEDICATIONS  (STANDING):  aspirin enteric coated 81 milliGRAM(s) Oral daily  atorvastatin 10 milliGRAM(s) Oral at bedtime  chlorhexidine 4% Liquid 1 Application(s) Topical <User Schedule>  heparin  Injectable 5000 Unit(s) SubCutaneous every 12 hours  metoprolol tartrate 12.5 milliGRAM(s) Oral two times a day  potassium acid phosphate/sodium acid phosphate tablet (K-PHOS No. 2) 1 Tablet(s) Oral four times a day with meals    MEDICATIONS  (PRN):      Vital Signs Last 24 Hrs  T(C): 36.8 (10 Dec 2018 05:00), Max: 36.8 (10 Dec 2018 05:00)  T(F): 98.2 (10 Dec 2018 05:00), Max: 98.2 (10 Dec 2018 05:00)  HR: 66 (10 Dec 2018 06:00) (62 - 84)  BP: 102/51 (10 Dec 2018 06:00) (81/35 - 108/54)  BP(mean): 76 (10 Dec 2018 06:00) (47 - 76)  RR: 25 (10 Dec 2018 05:00) (15 - 27)  SpO2: 92% (10 Dec 2018 06:00) (91% - 98%)  CAPILLARY BLOOD GLUCOSE        I&O's Summary    09 Dec 2018 07:01  -  10 Dec 2018 07:00  --------------------------------------------------------  IN: 957 mL / OUT: 950 mL / NET: 7 mL        PHYSICAL EXAM:  GENERAL: NAD, well-developed  HEAD:  Atraumatic, Normocephalic  EYES: EOMI, PERRLA, conjunctiva and sclera clear  NECK: Supple, No JVD  CHEST/LUNG: Clear to auscultation bilaterally; No wheeze  HEART: Regular rate and rhythm; No murmurs, rubs, or gallops  ABDOMEN: Soft, Nontender, Nondistended; Bowel sounds present  EXTREMITIES:  2+ Peripheral Pulses, No clubbing, cyanosis, or edema  PSYCH: AAOx3  NEUROLOGY: non-focal  SKIN: No rashes or lesions    LABS:                        11.0   7.5   )-----------( 122      ( 10 Dec 2018 04:20 )             31.6     12-10    138  |  104  |  22  ----------------------------<  96  4.0   |  22  |  0.93    Ca    8.5      10 Dec 2018 04:20  Phos  2.4     12-10  Mg     2.2     12-10        CARDIAC MARKERS ( 08 Dec 2018 22:10 )  x     / x     / 205 U/L / x     / 10.6 ng/mL  CARDIAC MARKERS ( 08 Dec 2018 15:56 )  x     / x     / 204 U/L / x     / 14.4 ng/mL  CARDIAC MARKERS ( 08 Dec 2018 08:56 )  x     / x     / 250 U/L / x     / 24.8 ng/mL      Urinalysis Basic - ( 09 Dec 2018 05:43 )    Color: Light Yellow / Appearance: Clear / S.016 / pH: x  Gluc: x / Ketone: Negative  / Bili: Negative / Urobili: Negative   Blood: x / Protein: Negative / Nitrite: Negative   Leuk Esterase: Negative / RBC: x / WBC x   Sq Epi: x / Non Sq Epi: x / Bacteria: x        RADIOLOGY & ADDITIONAL TESTS:    Imaging Personally Reviewed:    Consultant(s) Notes Reviewed:      Care Discussed with Consultants/Other Providers: Patient is a 87y old  Female who presents with a chief complaint of Chest pain (09 Dec 2018 07:46)      Jose Dalton  EM/IM PGY1    SUBJECTIVE / OVERNIGHT EVENTS: No acute events overnight. Pt denies any chest pain, SOB, palpitations, back pain, abd pain, n/v, leg swelling, or other complaints. She notes R inguinal swelling noted yesterday has decreased today.    MEDICATIONS  (STANDING):  aspirin enteric coated 81 milliGRAM(s) Oral daily  atorvastatin 10 milliGRAM(s) Oral at bedtime  chlorhexidine 4% Liquid 1 Application(s) Topical <User Schedule>  heparin  Injectable 5000 Unit(s) SubCutaneous every 12 hours  metoprolol tartrate 12.5 milliGRAM(s) Oral two times a day  potassium acid phosphate/sodium acid phosphate tablet (K-PHOS No. 2) 1 Tablet(s) Oral four times a day with meals    MEDICATIONS  (PRN):      Vital Signs Last 24 Hrs  T(C): 36.8 (10 Dec 2018 05:00), Max: 36.8 (10 Dec 2018 05:00)  T(F): 98.2 (10 Dec 2018 05:00), Max: 98.2 (10 Dec 2018 05:00)  HR: 66 (10 Dec 2018 06:00) (62 - 84)  BP: 102/51 (10 Dec 2018 06:00) (81/35 - 108/54)  BP(mean): 76 (10 Dec 2018 06:00) (47 - 76)  RR: 25 (10 Dec 2018 05:00) (15 - 27)  SpO2: 92% (10 Dec 2018 06:00) (91% - 98%)  CAPILLARY BLOOD GLUCOSE        I&O's Summary    09 Dec 2018 07:01  -  10 Dec 2018 07:00  --------------------------------------------------------  IN: 957 mL / OUT: 950 mL / NET: 7 mL        PHYSICAL EXAM:  GENERAL: NAD, well-developed  HEAD:  Atraumatic, Normocephalic  EYES: EOMI, PERRLA, conjunctiva and sclera clear  NECK: Supple, No JVD  CHEST/LUNG: Clear to auscultation bilaterally; No wheeze  HEART: Regular rate and rhythm; 3/6 holosystolic murmur  ABDOMEN: Soft, Nontender, Nondistended; Bowel sounds present  EXTREMITIES:  2+ Peripheral Pulses, No clubbing, cyanosis, or edema  PSYCH: AAOx3  NEUROLOGY: non-focal  SKIN: small ecchymosis at R groin femoral access site with small area of induration decreased in size from yesterday, nontender to palpation, no rash    LABS:                        11.0   7.5   )-----------( 122      ( 10 Dec 2018 04:20 )             31.6     12-10    138  |  104  |  22  ----------------------------<  96  4.0   |  22  |  0.93    Ca    8.5      10 Dec 2018 04:20  Phos  2.4     12-10  Mg     2.2     12-10        CARDIAC MARKERS ( 08 Dec 2018 22:10 )  x     / x     / 205 U/L / x     / 10.6 ng/mL  CARDIAC MARKERS ( 08 Dec 2018 15:56 )  x     / x     / 204 U/L / x     / 14.4 ng/mL  CARDIAC MARKERS ( 08 Dec 2018 08:56 )  x     / x     / 250 U/L / x     / 24.8 ng/mL      Urinalysis Basic - ( 09 Dec 2018 05:43 )    Color: Light Yellow / Appearance: Clear / S.016 / pH: x  Gluc: x / Ketone: Negative  / Bili: Negative / Urobili: Negative   Blood: x / Protein: Negative / Nitrite: Negative   Leuk Esterase: Negative / RBC: x / WBC x   Sq Epi: x / Non Sq Epi: x / Bacteria: x

## 2018-12-10 NOTE — DIETITIAN INITIAL EVALUATION ADULT. - ENERGY NEEDS
Ht: 4'11", Wt: 104.9, BMI: 21.1, IBW: 95 pounds, %%.  Pt is a 87 year old female with PMH of CAD, HTN, HLD, breast CA (s/p mastectomy) presenting with chest pain found to have heart failure secondary to Takotsubo cardiomyopathy.  Pt with no noted edema, skin intact.

## 2018-12-10 NOTE — DIETITIAN INITIAL EVALUATION ADULT. - NS AS NUTRI INTERV MEDICAL AND FOOD SUPPLEMENTS
Pt/daughter requesting Ensure Enlive in-house as pt was consuming PTA. Recommending adding Ensure Enlive once daily./Commercial beverage

## 2018-12-10 NOTE — DIETITIAN INITIAL EVALUATION ADULT. - ETIOLOGY
related to incomplete knowledge regarding nutrition-related guidelines related to limited exposure to nutrition-related guidelines

## 2018-12-10 NOTE — DIETITIAN INITIAL EVALUATION ADULT. - FACTORS AFF FOOD INTAKE
difficulty chewing/Daughter reports pt has full upper dentures and recently had two bottom teeth pulled, thus pt has been consuming minced/softer foods the past few weeks. Pt declines offer to alter diet in-house diet order to softer texture.

## 2018-12-10 NOTE — DIETITIAN INITIAL EVALUATION ADULT. - ORAL INTAKE PTA
good/Pt reports good appetite and intake PTA. Pt reports she typically consumes 3 meals daily consisting of oatmeal, berries, tuna fish, chicken, ravioli, mixed vegetables. Pt states she supplements her meals with Ensure a few times a week. Also supplements a MVI and fish oil daily. Pt reports good appetite and intake PTA. Pt reports she typically consumes 3 meals daily consisting of oatmeal, berries, tuna fish, chicken, ravioli, mixed vegetables. Pt states she supplements her meals with Ensure a few times a week. Also supplements a multivitamin and fish oil daily./good

## 2018-12-10 NOTE — PROGRESS NOTE ADULT - ASSESSMENT
87F with a history of HTN, HLD, breast CA (s/p mastectomy) who presents with a 2 week history of intermittent L sided chest pain. In the ED, the ECG concerning for an anterior STEMI however patient was almost chest pain free upon arrival. There was concern that it could be a late presentation STEMI. The patient received ASA load, clopidogrel, HSQ 5000, and was taken emergently to the cath lab. The LHC did not demonstrate any clinically relevant obstruction. 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      NEUROLOGIC:  - No active issues    SKIN:  Lines: PIVs  Decubiti: None    GI:  Diet: regular DASH TLC  GI stress prophylaxis not indicated    METABOLIC:  - no active issues    HEMATOLOGIC:  - no active issues    INFECTIOUS DISEASE:  - Was on cef/azithro for possible pna 12/8-12/9, however d/c'd now due to resolution of WBC count, negative blood cx, no active respiratory symptoms, improved BP, negative procalcitonin    HEMODYNAMICS:  - No longer requiring pressors, will restart patient's BB and ACE as tolerated.    CARDIOVASCULAR:  - EF 40% sev seg LVSD, mod DD, nml RVFs  - Now off pressors, will restart metoprolol tart 12.5 BID, will start lisinopril later if BP tolerates  - troponins downtrending  - C/w Atorvastatin 10 instead of pravastatin 40 (home med)    RESPIRATORY:  - O2 sat good on RA  - no active issues 87F with a history of HTN, HLD, breast CA (s/p mastectomy) who presents with a 2 week history of intermittent L sided chest pain. In the ED, the ECG concerning for an anterior STEMI however patient was almost chest pain free upon arrival. There was concern that it could be a late presentation STEMI. The patient received ASA load, clopidogrel, HSQ 5000, and was taken emergently to the cath lab. The LHC did not demonstrate any clinically relevant obstruction. 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      NEUROLOGIC:  - No active issues    SKIN:  Lines: PIVs  Decubiti: None    GI:  Diet: regular DASH TLC  GI stress prophylaxis not indicated    METABOLIC:  - no active issues    HEMATOLOGIC:  - no active issues    INFECTIOUS DISEASE:  - abx d/c'd    CARDIOVASCULAR:  - Hemodynamically stable   - TTE - EF 40% sev seg LVSD, mod DD, nml RVFs  - Metoprolol tart 12.5 BID today, switch to Toprol XL 12.5 tomorrow and will start lisinopril 2.5 tomorrow also if BP tolerates  - troponins downtrended  - C/w Atorvastatin 10 instead of pravastatin 40 (home med)    RESPIRATORY:  - O2 sat good on RA  - no active issues    DVT ppx:  - hep subq

## 2018-12-10 NOTE — DIETITIAN INITIAL EVALUATION ADULT. - ADHERENCE
good/Pt reports herself or her daughter prepares meals. Both report they do not cook with salt because "they know it's bad". Pt and daughter report no prior education on heart healthy diet or impact of sodium in the diet.

## 2018-12-11 ENCOUNTER — TRANSCRIPTION ENCOUNTER (OUTPATIENT)
Age: 83
End: 2018-12-11

## 2018-12-11 LAB
ANION GAP SERPL CALC-SCNC: 15 MMOL/L — SIGNIFICANT CHANGE UP (ref 5–17)
BUN SERPL-MCNC: 28 MG/DL — HIGH (ref 7–23)
CALCIUM SERPL-MCNC: 8.9 MG/DL — SIGNIFICANT CHANGE UP (ref 8.4–10.5)
CHLORIDE SERPL-SCNC: 100 MMOL/L — SIGNIFICANT CHANGE UP (ref 96–108)
CO2 SERPL-SCNC: 23 MMOL/L — SIGNIFICANT CHANGE UP (ref 22–31)
CREAT SERPL-MCNC: 1.13 MG/DL — SIGNIFICANT CHANGE UP (ref 0.5–1.3)
GLUCOSE SERPL-MCNC: 115 MG/DL — HIGH (ref 70–99)
HCT VFR BLD CALC: 33.3 % — LOW (ref 34.5–45)
HGB BLD-MCNC: 11.6 G/DL — SIGNIFICANT CHANGE UP (ref 11.5–15.5)
MAGNESIUM SERPL-MCNC: 2.3 MG/DL — SIGNIFICANT CHANGE UP (ref 1.6–2.6)
MCHC RBC-ENTMCNC: 31.2 PG — SIGNIFICANT CHANGE UP (ref 27–34)
MCHC RBC-ENTMCNC: 34.7 GM/DL — SIGNIFICANT CHANGE UP (ref 32–36)
MCV RBC AUTO: 89.9 FL — SIGNIFICANT CHANGE UP (ref 80–100)
PHOSPHATE SERPL-MCNC: 4.4 MG/DL — SIGNIFICANT CHANGE UP (ref 2.5–4.5)
PLATELET # BLD AUTO: 142 K/UL — LOW (ref 150–400)
POTASSIUM SERPL-MCNC: 3.7 MMOL/L — SIGNIFICANT CHANGE UP (ref 3.5–5.3)
POTASSIUM SERPL-SCNC: 3.7 MMOL/L — SIGNIFICANT CHANGE UP (ref 3.5–5.3)
RBC # BLD: 3.71 M/UL — LOW (ref 3.8–5.2)
RBC # FLD: 11.9 % — SIGNIFICANT CHANGE UP (ref 10.3–14.5)
SODIUM SERPL-SCNC: 138 MMOL/L — SIGNIFICANT CHANGE UP (ref 135–145)
WBC # BLD: 7.6 K/UL — SIGNIFICANT CHANGE UP (ref 3.8–10.5)
WBC # FLD AUTO: 7.6 K/UL — SIGNIFICANT CHANGE UP (ref 3.8–10.5)

## 2018-12-11 PROCEDURE — 99233 SBSQ HOSP IP/OBS HIGH 50: CPT | Mod: GC

## 2018-12-11 RX ORDER — BRIMONIDINE TARTRATE, TIMOLOL MALEATE 2; 5 MG/ML; MG/ML
1 SOLUTION/ DROPS OPHTHALMIC
Qty: 0 | Refills: 0 | Status: DISCONTINUED | OUTPATIENT
Start: 2018-12-11 | End: 2018-12-12

## 2018-12-11 RX ORDER — BIMATOPROST 0.3 MG/ML
1 SOLUTION/ DROPS OPHTHALMIC AT BEDTIME
Qty: 0 | Refills: 0 | Status: DISCONTINUED | OUTPATIENT
Start: 2018-12-11 | End: 2018-12-12

## 2018-12-11 RX ORDER — POTASSIUM CHLORIDE 20 MEQ
20 PACKET (EA) ORAL ONCE
Qty: 0 | Refills: 0 | Status: DISCONTINUED | OUTPATIENT
Start: 2018-12-11 | End: 2018-12-11

## 2018-12-11 RX ORDER — POTASSIUM CHLORIDE 20 MEQ
20 PACKET (EA) ORAL ONCE
Qty: 0 | Refills: 0 | Status: COMPLETED | OUTPATIENT
Start: 2018-12-11 | End: 2018-12-11

## 2018-12-11 RX ADMIN — Medication 81 MILLIGRAM(S): at 11:37

## 2018-12-11 RX ADMIN — Medication 12.5 MILLIGRAM(S): at 05:13

## 2018-12-11 RX ADMIN — HEPARIN SODIUM 5000 UNIT(S): 5000 INJECTION INTRAVENOUS; SUBCUTANEOUS at 17:13

## 2018-12-11 RX ADMIN — ATORVASTATIN CALCIUM 10 MILLIGRAM(S): 80 TABLET, FILM COATED ORAL at 21:20

## 2018-12-11 RX ADMIN — HEPARIN SODIUM 5000 UNIT(S): 5000 INJECTION INTRAVENOUS; SUBCUTANEOUS at 05:13

## 2018-12-11 RX ADMIN — BRIMONIDINE TARTRATE, TIMOLOL MALEATE 1 DROP(S): 2; 5 SOLUTION/ DROPS OPHTHALMIC at 17:13

## 2018-12-11 RX ADMIN — BIMATOPROST 1 DROP(S): 0.3 SOLUTION/ DROPS OPHTHALMIC at 21:20

## 2018-12-11 RX ADMIN — CHLORHEXIDINE GLUCONATE 1 APPLICATION(S): 213 SOLUTION TOPICAL at 05:14

## 2018-12-11 RX ADMIN — Medication 20 MILLIEQUIVALENT(S): at 06:58

## 2018-12-11 NOTE — PROGRESS NOTE ADULT - ASSESSMENT
87F with a history of HTN, HLD, breast CA (s/p mastectomy) who presents with a 2 week history of intermittent L sided chest pain. In the ED, the ECG concerning for an anterior STEMI however patient was almost chest pain free upon arrival. There was concern that it could be a late presentation STEMI. The patient received ASA load, clopidogrel, HSQ 5000, and was taken emergently to the cath lab. The LHC did not demonstrate any clinically relevant obstruction. 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      NEUROLOGIC:  - No active issues    SKIN:  Lines: PIVs  Decubiti: None    GI:  Diet: regular DASH TLC  GI stress prophylaxis not indicated    METABOLIC:  - no active issues    HEMATOLOGIC:  - no active issues    INFECTIOUS DISEASE:  - abx d/c'd    CARDIOVASCULAR:  - Hemodynamically stable   - TTE - EF 40% sev seg LVSD, mod DD, nml RVFs  - Metoprolol tart 12.5 BID today, switch to Toprol XL 12.5 tomorrow and will start lisinopril 2.5 tomorrow also if BP tolerates  - troponins downtrended  - C/w Atorvastatin 10 instead of pravastatin 40 (home med)    RESPIRATORY:  - O2 sat good on RA  - no active issues    DVT ppx:  - hep subq 87F with a history of HTN, HLD, breast CA (s/p mastectomy) who presents with a 2 week history of intermittent L sided chest pain. In the ED, the ECG concerning for an anterior STEMI however patient was almost chest pain free upon arrival. There was concern that it could be a late presentation STEMI. The patient received ASA load, clopidogrel, HSQ 5000, and was taken emergently to the cath lab. The LHC did not demonstrate any clinically relevant obstruction. 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      NEUROLOGIC:  - No active issues    SKIN:  Lines: PIVs  Decubiti: None    GI:  Diet: regular DASH TLC  GI stress prophylaxis not indicated    METABOLIC:  - no active issues    HEMATOLOGIC:  - no active issues    INFECTIOUS DISEASE:  - abx d/c'd    CARDIOVASCULAR:  - Hemodynamically stable   - TTE - EF 40% sev seg LVSD, mod DD, nml RVFs  - Toprol XL 12.5 today and will start lisinopril 2.5 if BP tolerates  - troponins downtrended  - C/w Atorvastatin 10 instead of pravastatin 40 (home med)    RESPIRATORY:  - O2 sat good on RA  - no active issues    DVT ppx:  - hep subq 87F with a history of HTN, HLD, breast CA (s/p mastectomy) who presents with a 2 week history of intermittent L sided chest pain. In the ED, the ECG concerning for an anterior STEMI however patient was almost chest pain free upon arrival. There was concern that it could be a late presentation STEMI. The patient received ASA load, clopidogrel, HSQ 5000, and was taken emergently to the cath lab. The LHC did not demonstrate any clinically relevant obstruction. 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      NEUROLOGIC:  - No active issues    SKIN:  Lines: PIVs  Decubiti: None    GI:  Diet: regular DASH TLC  GI stress prophylaxis not indicated    METABOLIC:  - no active issues    HEMATOLOGIC:  - no active issues    INFECTIOUS DISEASE:  - sepsis ruled out  - abx d/c'd    CARDIOVASCULAR:  - Hemodynamically stable   - TTE - EF 40% sev seg LVSD, mod DD, nml RVFs  - Toprol XL 12.5 today and will start lisinopril 2.5 if BP tolerates  - troponins downtrended  - C/w Atorvastatin 10 instead of pravastatin 40 (home med)    RESPIRATORY:  - O2 sat good on RA  - no active issues    DVT ppx:  - hep subq

## 2018-12-11 NOTE — DISCHARGE NOTE ADULT - PLAN OF CARE
Optimize cardiac function You presented to the hospital with chest pain, a cardiac catheterization was done which showed no significant obstruction in our coronary arteries. However, an echocardiogram showed decreased pumping function of your heart (ejection fraction of 40%, normal is >55%) so we have started you on a medication (metoprolol succnate 12.5mg once per day) that will help decrease strain on your heart. You should also be started on an ACE inhibitor (lisinopril) which we would have started here however your BP was too low. You should follow-up with a cardiologist in 1-2 weeks for further evaluation and management including a discussion of your medication regimen. You can call [phone] to make an appointment with a cardiologist associated with this hospital. You will also need an echocardiogram in 3 months to follow-up on your heart function and can schedule this with your cardiologist. Return to the hospital immediately for any new or worsening chest pain, shortness of breath, fainting, abdominal pain, nausea/vomiting, or any other new or concerning symptoms. Lastly, there was some slight bleeding at your right groin site where the catheter was placed originally, we observed this which stabilized during your stay here. Please return to the hospital immediately if bruising, pain or size of swelling worsens in that area. Blood pressure control You have a history of high blood pressure but your blood pressure was low during your stay here. Continue to take metoprolol succinate 12.5mg once per day, which your blood pressure tolerated. Follow-up with a cardiologist in 1-2 weeks for further evaluation and management of your blood pressure. Return to the hospital for any headaches, vision changes, numbness, weakness, fainting or other new or concerning symptoms.

## 2018-12-11 NOTE — DISCHARGE NOTE ADULT - MEDICATION SUMMARY - MEDICATIONS TO TAKE
I will START or STAY ON the medications listed below when I get home from the hospital:    Aspirin Enteric Coated 81 mg oral delayed release tablet  -- 1 tab(s) by mouth once a day  -- Indication: For CAD (coronary artery disease)    pravastatin 40 mg oral tablet  -- 1 tab(s) by mouth once a day  -- Indication: For Hyperlipidemia    Metoprolol Succinate ER 25 mg oral tablet, extended release  -- 0.5 tab(s) by mouth once a day  -- Indication: For Acute systolic heart failure    bimatoprost 0.01% ophthalmic solution  -- 1 drop(s) to each affected eye once a day (at bedtime)  -- Indication: For Cataract    brimonidine-timolol 0.2%-0.5% ophthalmic solution  -- 1 drop(s) to each affected eye 2 times a day  -- Indication: For Cataract

## 2018-12-11 NOTE — DISCHARGE NOTE ADULT - HOSPITAL COURSE
This is an 86 y/o F with history of HTN, HLD, breast CA (s/p mastectomy) who presented with 2 weeks of intermittent L-sided chest pain. Upon arrival to the ED EKG was concerning for an anterior STEMI, however patient was almost chest pain free upon arrival. The patient received ASA load, clopidogrel, HSQ 5000, and was taken emergently to the cath lab. The LHC demonstrated non-obstructive CAD. TTE showed EF 40%, severe segmental left ventricular systolic dysfunction and severely hypokinetic mid to distal segments and apex consistent with stress induced cardiomyopathy (Takatsubo CM), no LV thrombus, normal RV size and function, moderate pulmonary pressures. Cardiac enzymes downtrended. Pt developed hypotension after the catheterization and was given IVF, started on vasopressin and abx (concern for CAP given recent cough and URI symptoms). The next day patient's BP improved, abx d/c'd gven no leukocytosis/negative blood cx/asymptomatic/negative procalcitonin. It was noted pt had a small hematoma to R groin site after the procedure, this was monitored and decreased in size with improvement in ecchymoses during her stay. Pressors were d/c'd. Lopressor 12.5mg BID was started which was transitioned to Toprol XL 12.5mg. Plan was to start ACE-I during patient's stay but this did not occur due to low MAPs. Patient is stabilized for discharge home and will follow-up with a cardiologist in 1-2 weeks for further evaluation and medication management and a repeat echocardiogram in 3 months.

## 2018-12-11 NOTE — DISCHARGE NOTE ADULT - CARE PLAN
Principal Discharge DX:	Acute systolic heart failure  Secondary Diagnosis:	Hypertension, unspecified type Principal Discharge DX:	Acute systolic heart failure  Goal:	Optimize cardiac function  Assessment and plan of treatment:	You presented to the hospital with chest pain, a cardiac catheterization was done which showed no significant obstruction in our coronary arteries. However, an echocardiogram showed decreased pumping function of your heart (ejection fraction of 40%, normal is >55%) so we have started you on a medication (metoprolol succnate 12.5mg once per day) that will help decrease strain on your heart. You should also be started on an ACE inhibitor (lisinopril) which we would have started here however your BP was too low. You should follow-up with a cardiologist in 1-2 weeks for further evaluation and management including a discussion of your medication regimen. You can call [phone] to make an appointment with a cardiologist associated with this hospital. You will also need an echocardiogram in 3 months to follow-up on your heart function and can schedule this with your cardiologist. Return to the hospital immediately for any new or worsening chest pain, shortness of breath, fainting, abdominal pain, nausea/vomiting, or any other new or concerning symptoms. Lastly, there was some slight bleeding at your right groin site where the catheter was placed originally, we observed this which stabilized during your stay here. Please return to the hospital immediately if bruising, pain or size of swelling worsens in that area.  Secondary Diagnosis:	Hypertension, unspecified type  Goal:	Blood pressure control  Assessment and plan of treatment:	You have a history of high blood pressure but your blood pressure was low during your stay here. Continue to take metoprolol succinate 12.5mg once per day, which your blood pressure tolerated. Follow-up with a cardiologist in 1-2 weeks for further evaluation and management of your blood pressure. Return to the hospital for any headaches, vision changes, numbness, weakness, fainting or other new or concerning symptoms.

## 2018-12-11 NOTE — DISCHARGE NOTE ADULT - HOME CARE AGENCY
[facility] [phone]  A visiting nurse will contact you in 1-2 days to schedule first visit

## 2018-12-11 NOTE — PROGRESS NOTE ADULT - SUBJECTIVE AND OBJECTIVE BOX
Patient is a 87y old  Female who presents with a chief complaint of Chest pain (10 Dec 2018 07:45)      Jose Dalton  EM/IM PGY1    SUBJECTIVE / OVERNIGHT EVENTS:    MEDICATIONS  (STANDING):  aspirin enteric coated 81 milliGRAM(s) Oral daily  atorvastatin 10 milliGRAM(s) Oral at bedtime  chlorhexidine 4% Liquid 1 Application(s) Topical <User Schedule>  heparin  Injectable 5000 Unit(s) SubCutaneous every 12 hours  metoprolol succinate ER 12.5 milliGRAM(s) Oral daily    MEDICATIONS  (PRN):      Vital Signs Last 24 Hrs  T(C): 36.6 (11 Dec 2018 05:06), Max: 36.7 (10 Dec 2018 12:00)  T(F): 97.9 (11 Dec 2018 05:06), Max: 98 (10 Dec 2018 12:00)  HR: 72 (11 Dec 2018 07:00) (60 - 72)  BP: 108/53 (11 Dec 2018 07:00) (82/43 - 115/51)  BP(mean): 64 (11 Dec 2018 07:00) (55 - 79)  RR: 16 (11 Dec 2018 07:00) (16 - 20)  SpO2: 97% (10 Dec 2018 17:00) (96% - 97%)  CAPILLARY BLOOD GLUCOSE        I&O's Summary    10 Dec 2018 07:01  -  11 Dec 2018 07:00  --------------------------------------------------------  IN: 790 mL / OUT: 800 mL / NET: -10 mL        PHYSICAL EXAM:  GENERAL: NAD, well-developed  HEAD:  Atraumatic, Normocephalic  EYES: EOMI, PERRLA, conjunctiva and sclera clear  NECK: Supple, No JVD  CHEST/LUNG: Clear to auscultation bilaterally; No wheeze  HEART: Regular rate and rhythm; No murmurs, rubs, or gallops  ABDOMEN: Soft, Nontender, Nondistended; Bowel sounds present  EXTREMITIES:  2+ Peripheral Pulses, No clubbing, cyanosis, or edema  PSYCH: AAOx3  NEUROLOGY: non-focal  SKIN: No rashes or lesions    LABS:                        11.6   7.6   )-----------( 142      ( 11 Dec 2018 01:41 )             33.3     12-11    138  |  100  |  28<H>  ----------------------------<  115<H>  3.7   |  23  |  1.13    Ca    8.9      11 Dec 2018 01:41  Phos  4.4     12-11  Mg     2.3     12-11 Patient is a 87y old  Female who presents with a chief complaint of Chest pain (10 Dec 2018 07:45)      Jose Dalton  EM/IM PGY1    SUBJECTIVE / OVERNIGHT EVENTS: No acute events overnight. Pt reports a mild nonproductive cough that has been persistent for a month. She denies any fevers, chest pain, SOB, palpitations, abd pain, n/v, leg swelling or other complaints at this time.    MEDICATIONS  (STANDING):  aspirin enteric coated 81 milliGRAM(s) Oral daily  atorvastatin 10 milliGRAM(s) Oral at bedtime  chlorhexidine 4% Liquid 1 Application(s) Topical <User Schedule>  heparin  Injectable 5000 Unit(s) SubCutaneous every 12 hours  metoprolol succinate ER 12.5 milliGRAM(s) Oral daily    MEDICATIONS  (PRN):      Vital Signs Last 24 Hrs  T(C): 36.6 (11 Dec 2018 05:06), Max: 36.7 (10 Dec 2018 12:00)  T(F): 97.9 (11 Dec 2018 05:06), Max: 98 (10 Dec 2018 12:00)  HR: 72 (11 Dec 2018 07:00) (60 - 72)  BP: 108/53 (11 Dec 2018 07:00) (82/43 - 115/51)  BP(mean): 64 (11 Dec 2018 07:00) (55 - 79)  RR: 16 (11 Dec 2018 07:00) (16 - 20)  SpO2: 97% (10 Dec 2018 17:00) (96% - 97%)  CAPILLARY BLOOD GLUCOSE        I&O's Summary    10 Dec 2018 07:01  -  11 Dec 2018 07:00  --------------------------------------------------------  IN: 790 mL / OUT: 800 mL / NET: -10 mL        PHYSICAL EXAM:  GENERAL: NAD, well-developed  HEAD:  Atraumatic, Normocephalic  EYES: EOMI, PERRLA, conjunctiva and sclera clear  NECK: Supple, No JVD  CHEST/LUNG: Clear to auscultation bilaterally; No wheeze  HEART: Regular rate and rhythm; 3/6 holosystolic murmur  ABDOMEN: Soft, Nontender, Nondistended; Bowel sounds present  EXTREMITIES:  2+ Peripheral Pulses, No clubbing, cyanosis, or edema  PSYCH: AAOx3  NEUROLOGY: non-focal  SKIN: small ecchymosis at R groin femoral access site with small area of induration, nontender to palpation, no rash    LABS:                        11.6   7.6   )-----------( 142      ( 11 Dec 2018 01:41 )             33.3     12-11    138  |  100  |  28<H>  ----------------------------<  115<H>  3.7   |  23  |  1.13    Ca    8.9      11 Dec 2018 01:41  Phos  4.4     12-11  Mg     2.3     12-11

## 2018-12-11 NOTE — CHART NOTE - NSCHARTNOTEFT_GEN_A_CORE
====================  CCU MIDNIGHT ROUNDS  ====================    [facility]  [id number]    ====================  SUMMARY: HPI:  The patient is an 87F with a history of HTN, HLD, breast CA (s/p mastectomy) who presents with a 2 week history of intermittent L sided chest pain. In the ED, the ECG concerning for an anterior STEMI however patient was almost chest pain free upon arrival. There was concern that it could be a late presentation STEMI. The patient received ASA load, clopidogrel, HSQ 5000, and was taken emergently to the cath lab. The [facility] did not demonstrate any clinically relevant obstruction. Official cath report pending. Patient admitted to CCU. 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 The patient reports she is redoing her kitchen which is causing her to be stressed out. Patient reports the pain was intermittent, would last for a short time, and would sometimes be as bad as 7/10. She denies exacerbating or relieving symptoms. She reports she has been belching more often recently however denies and sob, diaphoresis, nausea, or vomiting.       She reports that she does have chronic, left chest discomfort which she believes is related to her left breast mastectomy She had an US and an MRI of her left chest a few years ago to evaluate the mastectomy and reports there were no issues found.     She reports that 15-20 years ago she had a "small cardiac arrest." She does not believe she had a cath at that time but does not remember clearly. She had an echo a few months ago which she and her daughters say was normal. She has an appointment to see a cardiologist in February. She sees her PCP regularly.     Home meds include: ASA 81, Pravastatin 40, Lisinopril 10.     She endorses allergies to PCN (rash/fever) and sulfa drugs     PCP Dr. Delicia Jarvis [facility]. (08 Dec 2018 00:16)    ====================        ====================  NEW EVENTS:  No events or complaints.   ====================        ====================  VITALS (Last 12 hrs):  ====================    T(C): 36.7 (12-11-18 @ 16:00), Max: 36.7 (12-11-18 @ 11:00)  HR: 52 (12-11-18 @ 21:21) (52 - 72)  BP: 82/52 (12-11-18 @ 21:21) (82/52 - 96/46)  BP(mean): 63 (12-11-18 @ 21:21) (54 - 73)  RR: 17 (12-11-18 @ 18:35) (17 - 20)  SpO2: 98% (12-11-18 @ 17:00) (97% - 99%)      TELEMETRY: sinus rhythm        I&O's Summary    10 Dec 2018 07:01  -  11 Dec 2018 07:00  --------------------------------------------------------  IN: 790 mL / OUT: 800 mL / NET: -10 mL    11 Dec 2018 07:01  -  11 Dec 2018 22:29  --------------------------------------------------------  IN: 540 mL / OUT: 200 mL / NET: 340 mL        ====================  PLAN:  - Takotsubo CM: s/p cath w/ non-obs dz. EF 40% on echo with normal RVSF. Pt briefly required vasopression for hypotension due to overdiuresis. Continue medical management including ASA, statin, and low dose BB. Unable to add ACE-I 2/2 low BPs. Monitor lytes, I/Os, and weights. Anticipate d/c tomorrow.   ====================    HEALTH ISSUES - PROBLEM Dx:        [facility] #[id number] ====================  CCU MIDNIGHT ROUNDS  ====================    [facility]  [id number]    ====================  SUMMARY: HPI:  The patient is an 87F with a history of HTN, HLD, breast CA (s/p mastectomy) who presents with a 2 week history of intermittent L sided chest pain. In the ED, the ECG concerning for an anterior STEMI however patient was almost chest pain free upon arrival. There was concern that it could be a late presentation STEMI. The patient received ASA load, clopidogrel, HSQ 5000, and was taken emergently to the cath lab. The [facility] did not demonstrate any clinically relevant obstruction. Official cath report pending. Patient admitted to CCU. 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 The patient reports she is redoing her kitchen which is causing her to be stressed out. Patient reports the pain was intermittent, would last for a short time, and would sometimes be as bad as 7/10. She denies exacerbating or relieving symptoms. She reports she has been belching more often recently however denies and sob, diaphoresis, nausea, or vomiting.       She reports that she does have chronic, left chest discomfort which she believes is related to her left breast mastectomy She had an US and an MRI of her left chest a few years ago to evaluate the mastectomy and reports there were no issues found.     She reports that 15-20 years ago she had a "small cardiac arrest." She does not believe she had a cath at that time but does not remember clearly. She had an echo a few months ago which she and her daughters say was normal. She has an appointment to see a cardiologist in February. She sees her PCP regularly.     Home meds include: ASA 81, Pravastatin 40, Lisinopril 10.     She endorses allergies to PCN (rash/fever) and sulfa drugs     PCP Dr. Delicia Jarvis [facility]. (08 Dec 2018 00:16)    ====================        ====================  NEW EVENTS:  No events or complaints.   ====================        ====================  VITALS (Last 12 hrs):  ====================    T(C): 36.7 (12-11-18 @ 16:00), Max: 36.7 (12-11-18 @ 11:00)  HR: 52 (12-11-18 @ 21:21) (52 - 72)  BP: 82/52 (12-11-18 @ 21:21) (82/52 - 96/46)  BP(mean): 63 (12-11-18 @ 21:21) (54 - 73)  RR: 17 (12-11-18 @ 18:35) (17 - 20)  SpO2: 98% (12-11-18 @ 17:00) (97% - 99%)      TELEMETRY: sinus rhythm        I&O's Summary    10 Dec 2018 07:01  -  11 Dec 2018 07:00  --------------------------------------------------------  IN: 790 mL / OUT: 800 mL / NET: -10 mL    11 Dec 2018 07:01  -  11 Dec 2018 22:29  --------------------------------------------------------  IN: 540 mL / OUT: 200 mL / NET: 340 mL        ====================  PLAN:  - Takotsubo CM: s/p cath w/ non-obs dz. EF 40% on echo with normal RVSF. Pt briefly required vasopression for hypotension due to overdiuresis. Continue medical management including ASA, statin, and low dose BB. Unable to add ACE-I 2/2 low BPs. Monitor lytes, I/Os, and weights. Anticipate d/c tomorrow.   ====================    HEALTH ISSUES - PROBLEM Dx:        Kathleen Louise, CCU PA #[id number]    CCU Fellow Addendum    Agree with above. Pt a/w Takotsubo CM. c/w ASA, beta blocker, statin. BP is soft but she is asymptomatic.    Po Gomez MD  Cardiology Fellow  p: [phone]

## 2018-12-11 NOTE — CHART NOTE - NSCHARTNOTEFT_GEN_A_CORE
====================  CCU MIDNIGHT ROUNDS  ====================    [facility]  [id number]    ====================  SUMMARY: HPI:  The patient is an 87F with a history of HTN, HLD, breast CA (s/p mastectomy) who presents with a 2 week history of intermittent L sided chest pain. In the ED, the ECG concerning for an anterior STEMI however patient was almost chest pain free upon arrival. There was concern that it could be a late presentation STEMI. The patient received ASA load, clopidogrel, HSQ 5000, and was taken emergently to the cath lab. The [facility] did not demonstrate any clinically relevant obstruction. Official cath report pending. Patient admitted to CCU. 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 The patient reports she is redoing her kitchen which is causing her to be stressed out. Patient reports the pain was intermittent, would last for a short time, and would sometimes be as bad as 7/10. She denies exacerbating or relieving symptoms. She reports she has been belching more often recently however denies and sob, diaphoresis, nausea, or vomiting.       She reports that she does have chronic, left chest discomfort which she believes is related to her left breast mastectomy She had an US and an MRI of her left chest a few years ago to evaluate the mastectomy and reports there were no issues found.     She reports that 15-20 years ago she had a "small cardiac arrest." She does not believe she had a cath at that time but does not remember clearly. She had an echo a few months ago which she and her daughters say was normal. She has an appointment to see a cardiologist in February. She sees her PCP regularly.     Home meds include: ASA 81, Pravastatin 40, Lisinopril 10.     She endorses allergies to PCN (rash/fever) and sulfa drugs     PCP Dr. Delicia Jarvis [facility]. (08 Dec 2018 00:16)    ====================        ====================  NEW EVENTS:  ====================  Small right groin hematoma unchanged. MAP 59 on low does Toprol XL.      ====================  VITALS (Last 12 hrs):  ====================    T(C): 36.3 (12-10-18 @ 23:00), Max: 36.6 (12-10-18 @ 17:00)  HR: 66 (12-11-18 @ 00:00) (60 - 70)  BP: 91/44 (12-11-18 @ 00:00) (82/43 - 109/54)  BP(mean): 59 (12-11-18 @ 00:00) (55 - 79)  RR: 18 (12-10-18 @ 17:00) (17 - 20)  SpO2: 97% (12-10-18 @ 17:00) (96% - 97%)      TELEMETRY: MAZIN      I&O's Summary    09 Dec 2018 07:01  -  10 Dec 2018 07:00  --------------------------------------------------------  IN: 957 mL / OUT: 950 mL / NET: 7 mL    10 Dec 2018 07:01  -  11 Dec 2018 00:50  --------------------------------------------------------  IN: 790 mL / OUT: 800 mL / NET: -10 mL        ====================  PLAN:  ====================  87F with HTN, HLD a/w AW JOSIE s/p LHC with normal coronaries, likely 2/2 Takotsubo's CM. Doing well. No chest pain on SOB. HR 60-70. SBP . Euvolemic. Troponin downtrended. Renal function normal. LDL 81. A1c 5.4%. Echo with severe LV dysfunction and DD Grade II.    - Cont ASA and Lipitor  - Cont Toprol XL 12.5mg daily. MAPs of 50s to 70s throughout the day. Start lisinopril 2.5 mg qd in the morning if BP tolerates  - Repeat Echo in 90 days to assess EF recovery  - Cont to monitor small right groin hematoma, currently unchanged/improved.    Bairon Wallis, CCU PA-C  #[id number]

## 2018-12-12 VITALS
DIASTOLIC BLOOD PRESSURE: 50 MMHG | SYSTOLIC BLOOD PRESSURE: 101 MMHG | HEART RATE: 68 BPM | RESPIRATION RATE: 16 BRPM | OXYGEN SATURATION: 98 %

## 2018-12-12 LAB
ANION GAP SERPL CALC-SCNC: 12 MMOL/L — SIGNIFICANT CHANGE UP (ref 5–17)
BUN SERPL-MCNC: 27 MG/DL — HIGH (ref 7–23)
CALCIUM SERPL-MCNC: 8.8 MG/DL — SIGNIFICANT CHANGE UP (ref 8.4–10.5)
CHLORIDE SERPL-SCNC: 103 MMOL/L — SIGNIFICANT CHANGE UP (ref 96–108)
CO2 SERPL-SCNC: 21 MMOL/L — LOW (ref 22–31)
CREAT SERPL-MCNC: 0.93 MG/DL — SIGNIFICANT CHANGE UP (ref 0.5–1.3)
GLUCOSE SERPL-MCNC: 102 MG/DL — HIGH (ref 70–99)
HCT VFR BLD CALC: 33 % — LOW (ref 34.5–45)
HGB BLD-MCNC: 11.5 G/DL — SIGNIFICANT CHANGE UP (ref 11.5–15.5)
MAGNESIUM SERPL-MCNC: 2.3 MG/DL — SIGNIFICANT CHANGE UP (ref 1.6–2.6)
MCHC RBC-ENTMCNC: 31.4 PG — SIGNIFICANT CHANGE UP (ref 27–34)
MCHC RBC-ENTMCNC: 34.9 GM/DL — SIGNIFICANT CHANGE UP (ref 32–36)
MCV RBC AUTO: 90.2 FL — SIGNIFICANT CHANGE UP (ref 80–100)
PHOSPHATE SERPL-MCNC: 3.5 MG/DL — SIGNIFICANT CHANGE UP (ref 2.5–4.5)
PLATELET # BLD AUTO: 154 K/UL — SIGNIFICANT CHANGE UP (ref 150–400)
POTASSIUM SERPL-MCNC: 4.4 MMOL/L — SIGNIFICANT CHANGE UP (ref 3.5–5.3)
POTASSIUM SERPL-SCNC: 4.4 MMOL/L — SIGNIFICANT CHANGE UP (ref 3.5–5.3)
RBC # BLD: 3.66 M/UL — LOW (ref 3.8–5.2)
RBC # FLD: 12 % — SIGNIFICANT CHANGE UP (ref 10.3–14.5)
SODIUM SERPL-SCNC: 136 MMOL/L — SIGNIFICANT CHANGE UP (ref 135–145)
WBC # BLD: 6.1 K/UL — SIGNIFICANT CHANGE UP (ref 3.8–10.5)
WBC # FLD AUTO: 6.1 K/UL — SIGNIFICANT CHANGE UP (ref 3.8–10.5)

## 2018-12-12 PROCEDURE — 86900 BLOOD TYPING SEROLOGIC ABO: CPT

## 2018-12-12 PROCEDURE — 93005 ELECTROCARDIOGRAM TRACING: CPT

## 2018-12-12 PROCEDURE — 93458 L HRT ARTERY/VENTRICLE ANGIO: CPT

## 2018-12-12 PROCEDURE — 83735 ASSAY OF MAGNESIUM: CPT

## 2018-12-12 PROCEDURE — 85730 THROMBOPLASTIN TIME PARTIAL: CPT

## 2018-12-12 PROCEDURE — 85610 PROTHROMBIN TIME: CPT

## 2018-12-12 PROCEDURE — C1760: CPT

## 2018-12-12 PROCEDURE — 87040 BLOOD CULTURE FOR BACTERIA: CPT

## 2018-12-12 PROCEDURE — 76937 US GUIDE VASCULAR ACCESS: CPT

## 2018-12-12 PROCEDURE — 82553 CREATINE MB FRACTION: CPT

## 2018-12-12 PROCEDURE — 80048 BASIC METABOLIC PNL TOTAL CA: CPT

## 2018-12-12 PROCEDURE — 99285 EMERGENCY DEPT VISIT HI MDM: CPT | Mod: 25

## 2018-12-12 PROCEDURE — 86850 RBC ANTIBODY SCREEN: CPT

## 2018-12-12 PROCEDURE — 84145 PROCALCITONIN (PCT): CPT

## 2018-12-12 PROCEDURE — 81003 URINALYSIS AUTO W/O SCOPE: CPT

## 2018-12-12 PROCEDURE — 83880 ASSAY OF NATRIURETIC PEPTIDE: CPT

## 2018-12-12 PROCEDURE — 87798 DETECT AGENT NOS DNA AMP: CPT

## 2018-12-12 PROCEDURE — 80053 COMPREHEN METABOLIC PANEL: CPT

## 2018-12-12 PROCEDURE — C1769: CPT

## 2018-12-12 PROCEDURE — 71045 X-RAY EXAM CHEST 1 VIEW: CPT

## 2018-12-12 PROCEDURE — 87581 M.PNEUMON DNA AMP PROBE: CPT

## 2018-12-12 PROCEDURE — C1887: CPT

## 2018-12-12 PROCEDURE — 87486 CHLMYD PNEUM DNA AMP PROBE: CPT

## 2018-12-12 PROCEDURE — 84443 ASSAY THYROID STIM HORMONE: CPT

## 2018-12-12 PROCEDURE — C1894: CPT

## 2018-12-12 PROCEDURE — 83605 ASSAY OF LACTIC ACID: CPT

## 2018-12-12 PROCEDURE — 82550 ASSAY OF CK (CPK): CPT

## 2018-12-12 PROCEDURE — 96374 THER/PROPH/DIAG INJ IV PUSH: CPT

## 2018-12-12 PROCEDURE — C8929: CPT

## 2018-12-12 PROCEDURE — 99233 SBSQ HOSP IP/OBS HIGH 50: CPT | Mod: GC

## 2018-12-12 PROCEDURE — 83036 HEMOGLOBIN GLYCOSYLATED A1C: CPT

## 2018-12-12 PROCEDURE — 87633 RESP VIRUS 12-25 TARGETS: CPT

## 2018-12-12 PROCEDURE — 84484 ASSAY OF TROPONIN QUANT: CPT

## 2018-12-12 PROCEDURE — 85027 COMPLETE CBC AUTOMATED: CPT

## 2018-12-12 PROCEDURE — 84100 ASSAY OF PHOSPHORUS: CPT

## 2018-12-12 PROCEDURE — 86901 BLOOD TYPING SEROLOGIC RH(D): CPT

## 2018-12-12 PROCEDURE — 80061 LIPID PANEL: CPT

## 2018-12-12 RX ORDER — DOCUSATE SODIUM 100 MG
1 CAPSULE ORAL
Qty: 30 | Refills: 0
Start: 2018-12-12 | End: 2019-01-10

## 2018-12-12 RX ORDER — METOPROLOL TARTRATE 50 MG
0.5 TABLET ORAL
Qty: 15 | Refills: 0
Start: 2018-12-12 | End: 2019-01-10

## 2018-12-12 RX ORDER — LISINOPRIL 2.5 MG/1
1 TABLET ORAL
Qty: 0 | Refills: 0 | COMMUNITY

## 2018-12-12 RX ORDER — BRIMONIDINE TARTRATE, TIMOLOL MALEATE 2; 5 MG/ML; MG/ML
1 SOLUTION/ DROPS OPHTHALMIC
Qty: 0 | Refills: 0 | DISCHARGE
Start: 2018-12-12

## 2018-12-12 RX ORDER — BIMATOPROST 0.3 MG/ML
1 SOLUTION/ DROPS OPHTHALMIC
Qty: 0 | Refills: 0 | DISCHARGE
Start: 2018-12-12

## 2018-12-12 RX ORDER — METOPROLOL TARTRATE 50 MG
0.5 TABLET ORAL
Qty: 0 | Refills: 0 | COMMUNITY

## 2018-12-12 RX ADMIN — BRIMONIDINE TARTRATE, TIMOLOL MALEATE 1 DROP(S): 2; 5 SOLUTION/ DROPS OPHTHALMIC at 06:00

## 2018-12-12 RX ADMIN — Medication 12.5 MILLIGRAM(S): at 06:00

## 2018-12-12 RX ADMIN — HEPARIN SODIUM 5000 UNIT(S): 5000 INJECTION INTRAVENOUS; SUBCUTANEOUS at 06:02

## 2018-12-12 RX ADMIN — Medication 81 MILLIGRAM(S): at 11:13

## 2018-12-12 RX ADMIN — CHLORHEXIDINE GLUCONATE 1 APPLICATION(S): 213 SOLUTION TOPICAL at 06:02

## 2018-12-12 NOTE — PROGRESS NOTE ADULT - SUBJECTIVE AND OBJECTIVE BOX
Patient is a 87y old  Female who presents with a chief complaint of Chest pain (11 Dec 2018 16:22)      Jose Dalton  EM/IM PGY1    SUBJECTIVE / OVERNIGHT EVENTS:    MEDICATIONS  (STANDING):  aspirin enteric coated 81 milliGRAM(s) Oral daily  atorvastatin 10 milliGRAM(s) Oral at bedtime  bimatoprost 0.01% Ophthalmic Solution 1 Drop(s) Left EYE at bedtime  brimonidine 0.2%/ timolol 0.5% Ophthalmic Solution 1 Drop(s) Left EYE two times a day  chlorhexidine 4% Liquid 1 Application(s) Topical <User Schedule>  heparin  Injectable 5000 Unit(s) SubCutaneous every 12 hours  metoprolol succinate ER 12.5 milliGRAM(s) Oral daily    MEDICATIONS  (PRN):      Vital Signs Last 24 Hrs  T(C): 36.7 (12 Dec 2018 04:20), Max: 36.7 (11 Dec 2018 11:00)  T(F): 98 (12 Dec 2018 04:20), Max: 98 (11 Dec 2018 11:00)  HR: 80 (12 Dec 2018 06:20) (52 - 80)  BP: 97/50 (12 Dec 2018 06:20) (82/52 - 107/53)  BP(mean): 70 (12 Dec 2018 06:20) (54 - 76)  RR: 14 (12 Dec 2018 06:20) (14 - 20)  SpO2: 98% (11 Dec 2018 17:00) (97% - 99%)  CAPILLARY BLOOD GLUCOSE        I&O's Summary    11 Dec 2018 07:01  -  12 Dec 2018 07:00  --------------------------------------------------------  IN: 540 mL / OUT: 1150 mL / NET: -610 mL        PHYSICAL EXAM:  GENERAL: NAD, well-developed  HEAD:  Atraumatic, Normocephalic  EYES: EOMI, PERRLA, conjunctiva and sclera clear  NECK: Supple, No JVD  CHEST/LUNG: Clear to auscultation bilaterally; No wheeze  HEART: Regular rate and rhythm; No murmurs, rubs, or gallops  ABDOMEN: Soft, Nontender, Nondistended; Bowel sounds present  EXTREMITIES:  2+ Peripheral Pulses, No clubbing, cyanosis, or edema  PSYCH: AAOx3  NEUROLOGY: non-focal  SKIN: No rashes or lesions    LABS:                        11.5   6.1   )-----------( 154      ( 12 Dec 2018 01:47 )             33.0     12-12    136  |  103  |  27<H>  ----------------------------<  102<H>  4.4   |  21<L>  |  0.93    Ca    8.8      12 Dec 2018 01:47  Phos  3.5     12-12  Mg     2.3     12-12 Patient is a 87y old  Female who presents with a chief complaint of Chest pain (11 Dec 2018 16:22)      Jose Dalton  EM/IM PGY1    SUBJECTIVE / OVERNIGHT EVENTS: No acute events overnight. Pt with low BP while sleeping yesterday but recovered once woken. Hemodynamically stable currently. Pt denies any chest pain, SOB, palpitations, abd pain, n/v, leg swelling or other complaints at this time.    MEDICATIONS  (STANDING):  aspirin enteric coated 81 milliGRAM(s) Oral daily  atorvastatin 10 milliGRAM(s) Oral at bedtime  bimatoprost 0.01% Ophthalmic Solution 1 Drop(s) Left EYE at bedtime  brimonidine 0.2%/ timolol 0.5% Ophthalmic Solution 1 Drop(s) Left EYE two times a day  chlorhexidine 4% Liquid 1 Application(s) Topical <User Schedule>  heparin  Injectable 5000 Unit(s) SubCutaneous every 12 hours  metoprolol succinate ER 12.5 milliGRAM(s) Oral daily    MEDICATIONS  (PRN):      Vital Signs Last 24 Hrs  T(C): 36.7 (12 Dec 2018 04:20), Max: 36.7 (11 Dec 2018 11:00)  T(F): 98 (12 Dec 2018 04:20), Max: 98 (11 Dec 2018 11:00)  HR: 80 (12 Dec 2018 06:20) (52 - 80)  BP: 97/50 (12 Dec 2018 06:20) (82/52 - 107/53)  BP(mean): 70 (12 Dec 2018 06:20) (54 - 76)  RR: 14 (12 Dec 2018 06:20) (14 - 20)  SpO2: 98% (11 Dec 2018 17:00) (97% - 99%)  CAPILLARY BLOOD GLUCOSE        I&O's Summary    11 Dec 2018 07:01  -  12 Dec 2018 07:00  --------------------------------------------------------  IN: 540 mL / OUT: 1150 mL / NET: -610 mL    PHYSICAL EXAM:  GENERAL: NAD, well-developed  HEAD:  Atraumatic, Normocephalic  EYES: EOMI, PERRLA, conjunctiva and sclera clear  NECK: Supple, No JVD  CHEST/LUNG: Clear to auscultation bilaterally; No wheeze  HEART: Regular rate and rhythm; 3/6 holosystolic murmur  ABDOMEN: Soft, Nontender, Nondistended; Bowel sounds present  EXTREMITIES:  2+ Peripheral Pulses, No clubbing, cyanosis, or edema  PSYCH: AAOx3  NEUROLOGY: non-focal  SKIN: small ecchymosis at R groin femoral access site with small area of induration, nontender to palpation, no rash    LABS:                        11.5   6.1   )-----------( 154      ( 12 Dec 2018 01:47 )             33.0     12-12    136  |  103  |  27<H>  ----------------------------<  102<H>  4.4   |  21<L>  |  0.93    Ca    8.8      12 Dec 2018 01:47  Phos  3.5     12-12  Mg     2.3     12-12

## 2018-12-12 NOTE — PROGRESS NOTE ADULT - ATTENDING COMMENTS
Patient is seen and examined with fellow, NP and the CCU house-staff. I agree with the history, physical and the assessment and plan.  stress induced CM - on low dose BB  OOB to chair
Seen/examined. Agree with above. 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  Cath without significant cad  Hypotensive requiring pressors yesterday, now improved though tenuous.  Echocardiogram reviewed  Watch volume status closely. Will avoid aggressive IVF resuscitation.  CE trending down.  Will be difficult to add bb and ace in the setting of hypotension, though will try to gradually introduce today.    Very high risk of decompensation. >35 min spent taking care of patient.
Seen/examined. Agree with above. 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  Cath without significant cad  Hypotensive requiring pressors  Echo to evaluate LV function and to r/o LVOT obstruction  Watch volume status closely. Will avoid aggressive IVF resuscitation.  CE trending down.  Will be difficult to add bb and ace in the setting of hypotension.    Very high risk of decompensation. >35 min spent taking care of patient.

## 2018-12-12 NOTE — PROGRESS NOTE ADULT - ASSESSMENT
87F with a history of HTN, HLD, breast CA (s/p mastectomy) who presents with a 2 week history of intermittent L sided chest pain. In the ED, the ECG concerning for an anterior STEMI however patient was almost chest pain free upon arrival. There was concern that it could be a late presentation STEMI. The patient received ASA load, clopidogrel, HSQ 5000, and was taken emergently to the cath lab. The LHC did not demonstrate any clinically relevant obstruction. 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      NEUROLOGIC:  - No active issues    SKIN:  Lines: PIVs  Decubiti: None    GI:  Diet: regular DASH TLC  GI stress prophylaxis not indicated    METABOLIC:  - no active issues    HEMATOLOGIC:  - no active issues    INFECTIOUS DISEASE:  - sepsis ruled out  - abx d/c'd    CARDIOVASCULAR:  - Hemodynamically stable   - TTE - EF 40% sev seg LVSD, mod DD, nml RVFs  - Toprol XL 12.5 today and will start lisinopril 2.5 if BP tolerates  - troponins downtrended  - C/w Atorvastatin 10 instead of pravastatin 40 (home med)    RESPIRATORY:  - O2 sat good on RA  - no active issues    DVT ppx:  - hep subq 87F with a history of HTN, HLD, breast CA (s/p mastectomy) who presents with a 2 week history of intermittent L sided chest pain. In the ED, the ECG concerning for an anterior STEMI however patient was almost chest pain free upon arrival. There was concern that it could be a late presentation STEMI. The patient received ASA load, clopidogrel, HSQ 5000, and was taken emergently to the cath lab. The LHC did not demonstrate any clinically relevant obstruction. 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      NEUROLOGIC:  - no active issues    GI:  - no active issues  Diet: regular DASH TLC    METABOLIC:  - no active issues    HEMATOLOGIC:  - no active issues    INFECTIOUS DISEASE:  - sepsis ruled out  - abx d/c'd    CARDIOVASCULAR:  - Hemodynamically stable   - TTE - EF 40% sev seg LVSD, mod DD, nml RVFs  - c/w toprol XL 12.5, MAPs too low to start lisinopril here, will be addressed as outpatient  - troponins downtrended  - C/w Atorvastatin 10 instead of pravastatin 40 (home med)    RESPIRATORY:  - O2 sat good on RA  - no active issues    DVT ppx:  - hep subq    DISPO:  - no PT needs, pt ambulating without assistance  Discharge home with instruction to follow-up with cardiology here in 1-2 wks for further evaluation and management

## 2018-12-13 LAB
CULTURE RESULTS: SIGNIFICANT CHANGE UP
CULTURE RESULTS: SIGNIFICANT CHANGE UP
SPECIMEN SOURCE: SIGNIFICANT CHANGE UP
SPECIMEN SOURCE: SIGNIFICANT CHANGE UP

## 2018-12-17 PROBLEM — I10 ESSENTIAL (PRIMARY) HYPERTENSION: Chronic | Status: ACTIVE | Noted: 2018-12-07

## 2018-12-27 ENCOUNTER — NON-APPOINTMENT (OUTPATIENT)
Age: 83
End: 2018-12-27

## 2018-12-27 ENCOUNTER — APPOINTMENT (OUTPATIENT)
Dept: CARDIOLOGY | Facility: CLINIC | Age: 83
End: 2018-12-27
Payer: MEDICARE

## 2018-12-27 VITALS
HEIGHT: 59 IN | BODY MASS INDEX: 21.17 KG/M2 | WEIGHT: 105 LBS | DIASTOLIC BLOOD PRESSURE: 71 MMHG | SYSTOLIC BLOOD PRESSURE: 156 MMHG | OXYGEN SATURATION: 99 % | HEART RATE: 56 BPM

## 2018-12-27 VITALS — DIASTOLIC BLOOD PRESSURE: 60 MMHG | SYSTOLIC BLOOD PRESSURE: 138 MMHG

## 2018-12-27 DIAGNOSIS — I25.2 OLD MYOCARDIAL INFARCTION: ICD-10-CM

## 2018-12-27 DIAGNOSIS — C50.919 MALIGNANT NEOPLASM OF UNSPECIFIED SITE OF UNSPECIFIED FEMALE BREAST: ICD-10-CM

## 2018-12-27 PROCEDURE — 99214 OFFICE O/P EST MOD 30 MIN: CPT

## 2018-12-27 RX ORDER — METOPROLOL TARTRATE 25 MG/1
25 TABLET, FILM COATED ORAL
Refills: 0 | Status: DISCONTINUED | COMMUNITY

## 2018-12-27 RX ORDER — PRAVASTATIN SODIUM 40 MG/1
40 TABLET ORAL
Refills: 0 | Status: DISCONTINUED | COMMUNITY

## 2018-12-27 RX ORDER — AMLODIPINE BESYLATE 2.5 MG/1
2.5 TABLET ORAL
Refills: 0 | Status: DISCONTINUED | COMMUNITY

## 2018-12-27 RX ORDER — PRAVASTATIN SODIUM 40 MG/1
40 TABLET ORAL DAILY
Qty: 90 | Refills: 1 | Status: ACTIVE | COMMUNITY

## 2018-12-27 RX ORDER — BIMATOPROST 0.3 MG/ML
0.03 SOLUTION/ DROPS OPHTHALMIC
Refills: 0 | Status: ACTIVE | COMMUNITY

## 2018-12-27 NOTE — DISCUSSION/SUMMARY
[___ Month(s)] : [unfilled] month(s) [FreeTextEntry1] : The patient is an 87-year-old female hypertension, hyperlipidemia, Takatsubo cardiomyopathy with resolving groin hematoma. \par #1 Groin- resolving hematoma\par #2 Takasubo- No CHF, echo next visit\par #3 Htn- add losartan 25mg daily to toprol 12.5mg\par #4 Lipids- on atorvastatin\par #5 General- encourage increase walking daily for exercise. Daughter caring for her now.

## 2018-12-27 NOTE — HISTORY OF PRESENT ILLNESS
[FreeTextEntry1] : Perla is an 87-year-old female s/p hospitalization for chest pain diagnosed with Tabatsubo. Course complicated by groin hematoma.Discharged on toprol, no ACEI. No chest pain, palpitations or shortness of breath.

## 2018-12-27 NOTE — PHYSICAL EXAM
[General Appearance - Well Developed] : well developed [Normal Appearance] : normal appearance [Well Groomed] : well groomed [General Appearance - Well Nourished] : well nourished [No Deformities] : no deformities [General Appearance - In No Acute Distress] : no acute distress [Normal Conjunctiva] : the conjunctiva exhibited no abnormalities [Eyelids - No Xanthelasma] : the eyelids demonstrated no xanthelasmas [Normal Oral Mucosa] : normal oral mucosa [No Oral Pallor] : no oral pallor [No Oral Cyanosis] : no oral cyanosis [Normal Jugular Venous A Waves Present] : normal jugular venous A waves present [Normal Jugular Venous V Waves Present] : normal jugular venous V waves present [No Jugular Venous Yan A Waves] : no jugular venous yan A waves [Heart Rate And Rhythm] : heart rate and rhythm were normal [Heart Sounds] : normal S1 and S2 [Murmurs] : no murmurs present [Respiration, Rhythm And Depth] : normal respiratory rhythm and effort [Exaggerated Use Of Accessory Muscles For Inspiration] : no accessory muscle use [Auscultation Breath Sounds / Voice Sounds] : lungs were clear to auscultation bilaterally [Abdomen Soft] : soft [Abdomen Tenderness] : non-tender [Abdomen Mass (___ Cm)] : no abdominal mass palpated [Abnormal Walk] : normal gait [Gait - Sufficient For Exercise Testing] : the gait was sufficient for exercise testing [Nail Clubbing] : no clubbing of the fingernails [Cyanosis, Localized] : no localized cyanosis [Petechial Hemorrhages (___cm)] : no petechial hemorrhages [Skin Color & Pigmentation] : normal skin color and pigmentation [] : no rash [No Venous Stasis] : no venous stasis [Skin Lesions] : no skin lesions [No Skin Ulcers] : no skin ulcer [No Xanthoma] : no  xanthoma was observed [Oriented To Time, Place, And Person] : oriented to person, place, and time [Affect] : the affect was normal [Mood] : the mood was normal [No Anxiety] : not feeling anxious

## 2018-12-27 NOTE — REASON FOR VISIT
[Follow-Up - From Hospitalization] : follow-up of a recent hospitalization for [Cardiomyopathy] : cardiomyopathy

## 2019-01-01 NOTE — DISCHARGE NOTE ADULT - PATIENT PORTAL LINK FT
You can access the [facility] Patient Portal, offered by [facility], by registering with the following website: [email or web address]
Statement Selected

## 2019-01-17 NOTE — PATIENT PROFILE ADULT - NSPROPTRIGHTNOTIFY_GEN_A_NUR
Endocrinology Endocrinology Infectious Disease Infectious Disease Infectious Disease Internal Medicine Internal Medicine Internal Medicine Internal Medicine Internal Medicine Internal Medicine Nephrology Nephrology Nephrology Surgery Surgery Surgery Surgery Surgery declines Internal Medicine

## 2019-01-24 ENCOUNTER — APPOINTMENT (OUTPATIENT)
Dept: CARDIOLOGY | Facility: CLINIC | Age: 84
End: 2019-01-24
Payer: MEDICARE

## 2019-01-24 VITALS
BODY MASS INDEX: 20.96 KG/M2 | DIASTOLIC BLOOD PRESSURE: 74 MMHG | HEIGHT: 59 IN | HEART RATE: 54 BPM | WEIGHT: 104 LBS | SYSTOLIC BLOOD PRESSURE: 148 MMHG | OXYGEN SATURATION: 100 %

## 2019-01-24 PROCEDURE — 99214 OFFICE O/P EST MOD 30 MIN: CPT

## 2019-01-24 NOTE — PHYSICAL EXAM
[General Appearance - Well Developed] : well developed [Normal Appearance] : normal appearance [Well Groomed] : well groomed [General Appearance - Well Nourished] : well nourished [No Deformities] : no deformities [General Appearance - In No Acute Distress] : no acute distress [Normal Conjunctiva] : the conjunctiva exhibited no abnormalities [Eyelids - No Xanthelasma] : the eyelids demonstrated no xanthelasmas [Normal Oral Mucosa] : normal oral mucosa [No Oral Pallor] : no oral pallor [No Oral Cyanosis] : no oral cyanosis [Normal Jugular Venous A Waves Present] : normal jugular venous A waves present [Normal Jugular Venous V Waves Present] : normal jugular venous V waves present [No Jugular Venous Yan A Waves] : no jugular venous yan A waves [Respiration, Rhythm And Depth] : normal respiratory rhythm and effort [Exaggerated Use Of Accessory Muscles For Inspiration] : no accessory muscle use [Auscultation Breath Sounds / Voice Sounds] : lungs were clear to auscultation bilaterally [Heart Rate And Rhythm] : heart rate and rhythm were normal [Heart Sounds] : normal S1 and S2 [Murmurs] : no murmurs present [Abdomen Soft] : soft [Abdomen Tenderness] : non-tender [Abdomen Mass (___ Cm)] : no abdominal mass palpated [Abnormal Walk] : normal gait [Gait - Sufficient For Exercise Testing] : the gait was sufficient for exercise testing [Nail Clubbing] : no clubbing of the fingernails [Cyanosis, Localized] : no localized cyanosis [Petechial Hemorrhages (___cm)] : no petechial hemorrhages [Skin Color & Pigmentation] : normal skin color and pigmentation [] : no rash [No Venous Stasis] : no venous stasis [Skin Lesions] : no skin lesions [No Skin Ulcers] : no skin ulcer [No Xanthoma] : no  xanthoma was observed [Oriented To Time, Place, And Person] : oriented to person, place, and time [Affect] : the affect was normal [Mood] : the mood was normal [No Anxiety] : not feeling anxious

## 2019-01-25 NOTE — HISTORY OF PRESENT ILLNESS
[FreeTextEntry1] : Perla has noticed her blood pressure readings to be occasionally elevated and is very concerned.

## 2019-01-25 NOTE — DISCUSSION/SUMMARY
[___ Month(s)] : [unfilled] month(s) [FreeTextEntry1] : The patient is an 87-year-old female hypertension, hyperlipidemia, Takatsubo cardiomyopathy with hypertension\par #1 Takasubo- No CHF, echo next visit\par #2 Htn- add HCTZ 12.5mg to losartan 25mg daily to toprol 12.5mg\par #3 Lipids- on atorvastatin\par #4 General- encourage increase walking daily for exercise. Daughter caring for her now.

## 2019-02-06 ENCOUNTER — APPOINTMENT (OUTPATIENT)
Dept: CARDIOLOGY | Facility: CLINIC | Age: 84
End: 2019-02-06
Payer: MEDICARE

## 2019-02-06 PROCEDURE — 93784 AMBL BP MNTR W/SOFTWARE: CPT

## 2019-02-11 RX ORDER — LOSARTAN POTASSIUM 25 MG/1
25 TABLET, FILM COATED ORAL DAILY
Qty: 90 | Refills: 1 | Status: DISCONTINUED | COMMUNITY
Start: 2018-12-27 | End: 2019-02-11

## 2019-02-11 RX ORDER — METOPROLOL SUCCINATE 25 MG/1
25 TABLET, EXTENDED RELEASE ORAL DAILY
Refills: 0 | Status: DISCONTINUED | COMMUNITY
End: 2019-02-11

## 2019-02-11 RX ORDER — HYDROCHLOROTHIAZIDE 12.5 MG/1
12.5 TABLET ORAL DAILY
Qty: 30 | Refills: 3 | Status: DISCONTINUED | COMMUNITY
Start: 2019-01-24 | End: 2019-02-11

## 2019-02-21 RX ORDER — LOSARTAN POTASSIUM AND HYDROCHLOROTHIAZIDE 12.5; 5 MG/1; MG/1
50-12.5 TABLET ORAL
Qty: 90 | Refills: 3 | Status: DISCONTINUED | COMMUNITY
Start: 2019-02-11 | End: 2019-02-21

## 2019-03-25 ENCOUNTER — APPOINTMENT (OUTPATIENT)
Dept: CT IMAGING | Facility: IMAGING CENTER | Age: 84
End: 2019-03-25
Payer: MEDICARE

## 2019-03-25 ENCOUNTER — OUTPATIENT (OUTPATIENT)
Dept: OUTPATIENT SERVICES | Facility: HOSPITAL | Age: 84
LOS: 1 days | End: 2019-03-25
Payer: COMMERCIAL

## 2019-03-25 DIAGNOSIS — R91.1 SOLITARY PULMONARY NODULE: ICD-10-CM

## 2019-03-25 DIAGNOSIS — Z90.12 ACQUIRED ABSENCE OF LEFT BREAST AND NIPPLE: Chronic | ICD-10-CM

## 2019-03-25 PROCEDURE — 71250 CT THORAX DX C-: CPT | Mod: 26

## 2019-03-25 PROCEDURE — 71250 CT THORAX DX C-: CPT

## 2019-03-28 ENCOUNTER — APPOINTMENT (OUTPATIENT)
Dept: CV DIAGNOSITCS | Facility: HOSPITAL | Age: 84
End: 2019-03-28

## 2019-03-28 ENCOUNTER — APPOINTMENT (OUTPATIENT)
Dept: CARDIOLOGY | Facility: CLINIC | Age: 84
End: 2019-03-28
Payer: MEDICARE

## 2019-03-28 ENCOUNTER — OUTPATIENT (OUTPATIENT)
Dept: OUTPATIENT SERVICES | Facility: HOSPITAL | Age: 84
LOS: 1 days | End: 2019-03-28
Payer: COMMERCIAL

## 2019-03-28 VITALS
BODY MASS INDEX: 20.76 KG/M2 | HEART RATE: 82 BPM | HEIGHT: 59 IN | WEIGHT: 103 LBS | SYSTOLIC BLOOD PRESSURE: 160 MMHG | OXYGEN SATURATION: 97 % | DIASTOLIC BLOOD PRESSURE: 79 MMHG

## 2019-03-28 DIAGNOSIS — I51.81 TAKOTSUBO SYNDROME: ICD-10-CM

## 2019-03-28 DIAGNOSIS — Z90.12 ACQUIRED ABSENCE OF LEFT BREAST AND NIPPLE: Chronic | ICD-10-CM

## 2019-03-28 PROCEDURE — 93306 TTE W/DOPPLER COMPLETE: CPT | Mod: 26

## 2019-03-28 PROCEDURE — 99214 OFFICE O/P EST MOD 30 MIN: CPT

## 2019-03-28 PROCEDURE — 93000 ELECTROCARDIOGRAM COMPLETE: CPT

## 2019-03-28 PROCEDURE — C8929: CPT

## 2019-03-28 RX ORDER — HYDROCHLOROTHIAZIDE 12.5 MG/1
12.5 TABLET ORAL DAILY
Refills: 0 | Status: DISCONTINUED | COMMUNITY
Start: 2019-02-21 | End: 2019-03-28

## 2019-03-29 NOTE — DISCUSSION/SUMMARY
[___ Month(s)] : [unfilled] month(s) [FreeTextEntry1] : THe patient is a n87-year-old female hypertension, hyperlipidemia, Takatsubo cardiomyopathy who is improving.\par #1 Takasubo- No CHF, echo today with improvement in EF\par #2 Htn- continue  losartan 25mg daily and toprol 12.5mg\par #3 Lipids- on pravastatin\par #4 General- encourage increase walking daily for exercise. Daughter caring for her now.

## 2019-04-26 ENCOUNTER — APPOINTMENT (OUTPATIENT)
Dept: CT IMAGING | Facility: CLINIC | Age: 84
End: 2019-04-26
Payer: MEDICARE

## 2019-04-26 ENCOUNTER — OUTPATIENT (OUTPATIENT)
Dept: OUTPATIENT SERVICES | Facility: HOSPITAL | Age: 84
LOS: 1 days | End: 2019-04-26
Payer: COMMERCIAL

## 2019-04-26 ENCOUNTER — APPOINTMENT (OUTPATIENT)
Dept: CT IMAGING | Facility: CLINIC | Age: 84
End: 2019-04-26

## 2019-04-26 DIAGNOSIS — Z00.8 ENCOUNTER FOR OTHER GENERAL EXAMINATION: ICD-10-CM

## 2019-04-26 DIAGNOSIS — Z90.12 ACQUIRED ABSENCE OF LEFT BREAST AND NIPPLE: Chronic | ICD-10-CM

## 2019-04-26 PROCEDURE — 71250 CT THORAX DX C-: CPT

## 2019-04-26 PROCEDURE — 71250 CT THORAX DX C-: CPT | Mod: 26

## 2019-06-04 ENCOUNTER — APPOINTMENT (OUTPATIENT)
Dept: CARDIOLOGY | Facility: CLINIC | Age: 84
End: 2019-06-04
Payer: MEDICARE

## 2019-06-04 VITALS
WEIGHT: 103 LBS | OXYGEN SATURATION: 100 % | BODY MASS INDEX: 20.76 KG/M2 | HEIGHT: 59 IN | HEART RATE: 62 BPM | DIASTOLIC BLOOD PRESSURE: 70 MMHG | SYSTOLIC BLOOD PRESSURE: 160 MMHG

## 2019-06-04 VITALS — DIASTOLIC BLOOD PRESSURE: 70 MMHG | SYSTOLIC BLOOD PRESSURE: 140 MMHG

## 2019-06-04 PROCEDURE — 93000 ELECTROCARDIOGRAM COMPLETE: CPT

## 2019-06-04 PROCEDURE — 99214 OFFICE O/P EST MOD 30 MIN: CPT

## 2019-06-04 NOTE — REVIEW OF SYSTEMS
[Negative] : Psychiatric [Shortness Of Breath] : no shortness of breath [Dyspnea on exertion] : not dyspnea during exertion [Lower Ext Edema] : no extremity edema [Chest Pain] : no chest pain [Palpitations] : no palpitations

## 2019-06-04 NOTE — HISTORY OF PRESENT ILLNESS
[FreeTextEntry1] : Perla is feeling better with improvement in her walking. BP better at home. Less short of breath and more energy.

## 2019-06-04 NOTE — PHYSICAL EXAM
[General Appearance - Well Developed] : well developed [Normal Appearance] : normal appearance [Well Groomed] : well groomed [General Appearance - In No Acute Distress] : no acute distress [General Appearance - Well Nourished] : well nourished [No Deformities] : no deformities [Eyelids - No Xanthelasma] : the eyelids demonstrated no xanthelasmas [Normal Conjunctiva] : the conjunctiva exhibited no abnormalities [Normal Oral Mucosa] : normal oral mucosa [No Oral Cyanosis] : no oral cyanosis [No Oral Pallor] : no oral pallor [Normal Jugular Venous A Waves Present] : normal jugular venous A waves present [Normal Jugular Venous V Waves Present] : normal jugular venous V waves present [No Jugular Venous Yan A Waves] : no jugular venous yan A waves [Respiration, Rhythm And Depth] : normal respiratory rhythm and effort [Exaggerated Use Of Accessory Muscles For Inspiration] : no accessory muscle use [Heart Rate And Rhythm] : heart rate and rhythm were normal [Heart Sounds] : normal S1 and S2 [Auscultation Breath Sounds / Voice Sounds] : lungs were clear to auscultation bilaterally [Abdomen Soft] : soft [Abdomen Tenderness] : non-tender [Murmurs] : no murmurs present [Abdomen Mass (___ Cm)] : no abdominal mass palpated [Gait - Sufficient For Exercise Testing] : the gait was sufficient for exercise testing [Cyanosis, Localized] : no localized cyanosis [Nail Clubbing] : no clubbing of the fingernails [Abnormal Walk] : normal gait [Petechial Hemorrhages (___cm)] : no petechial hemorrhages [Skin Color & Pigmentation] : normal skin color and pigmentation [] : no rash [No Venous Stasis] : no venous stasis [Skin Lesions] : no skin lesions [No Xanthoma] : no  xanthoma was observed [No Skin Ulcers] : no skin ulcer [Affect] : the affect was normal [Mood] : the mood was normal [Oriented To Time, Place, And Person] : oriented to person, place, and time [No Anxiety] : not feeling anxious

## 2019-06-04 NOTE — DISCUSSION/SUMMARY
[___ Month(s)] : [unfilled] month(s) [FreeTextEntry1] : THe patient is an 87-year-old female hypertension, hyperlipidemia, Takatsubo cardiomyopathy who is doing well\par #1 Takasubo- No CHF, EF resolved\par #2 Htn- continue  losartan 50mg\par #3 Lipids- on pravastatin, will bring recent labs\par #4 General- encourage increase walking daily for exercise. Daughter caring for her now.

## 2019-11-05 ENCOUNTER — APPOINTMENT (OUTPATIENT)
Dept: CARDIOLOGY | Facility: CLINIC | Age: 84
End: 2019-11-05
Payer: MEDICARE

## 2019-11-05 VITALS — SYSTOLIC BLOOD PRESSURE: 140 MMHG | DIASTOLIC BLOOD PRESSURE: 68 MMHG

## 2019-11-05 VITALS
HEIGHT: 59 IN | WEIGHT: 102 LBS | SYSTOLIC BLOOD PRESSURE: 164 MMHG | OXYGEN SATURATION: 100 % | HEART RATE: 54 BPM | BODY MASS INDEX: 20.56 KG/M2 | DIASTOLIC BLOOD PRESSURE: 72 MMHG

## 2019-11-05 DIAGNOSIS — I51.81 TAKOTSUBO SYNDROME: ICD-10-CM

## 2019-11-05 DIAGNOSIS — Z86.39 PERSONAL HISTORY OF OTHER ENDOCRINE, NUTRITIONAL AND METABOLIC DISEASE: ICD-10-CM

## 2019-11-05 DIAGNOSIS — M85.80 OTHER SPECIFIED DISORDERS OF BONE DENSITY AND STRUCTURE, UNSPECIFIED SITE: ICD-10-CM

## 2019-11-05 DIAGNOSIS — R03.0 ELEVATED BLOOD-PRESSURE READING, W/OUT DIAGNOSIS OF HYPERTENSION: ICD-10-CM

## 2019-11-05 DIAGNOSIS — Z86.79 PERSONAL HISTORY OF OTHER DISEASES OF THE CIRCULATORY SYSTEM: ICD-10-CM

## 2019-11-05 PROCEDURE — 99214 OFFICE O/P EST MOD 30 MIN: CPT

## 2019-11-05 PROCEDURE — 93000 ELECTROCARDIOGRAM COMPLETE: CPT

## 2019-11-05 RX ORDER — LOSARTAN POTASSIUM 25 MG/1
25 TABLET, FILM COATED ORAL DAILY
Qty: 90 | Refills: 1 | Status: ACTIVE | COMMUNITY
Start: 2019-02-21

## 2019-11-05 NOTE — DISCUSSION/SUMMARY
[___ Month(s)] : [unfilled] month(s) [FreeTextEntry1] : THe patient is an 88-year-old female hypertension, hyperlipidemia, Takatsubo cardiomyopathy who is doing well\par #1 Takasubo- No CHF, EF resolved\par #2 Htn- continue  losartan 50mg\par #3 Lipids- on pravastatin, labs therapeutic\par #4 General- encourage increase walking daily for exercise. Daughter caring for her now.

## 2019-11-25 ENCOUNTER — APPOINTMENT (OUTPATIENT)
Dept: DERMATOLOGY | Facility: CLINIC | Age: 84
End: 2019-11-25
Payer: MEDICARE

## 2019-11-25 ENCOUNTER — LABORATORY RESULT (OUTPATIENT)
Age: 84
End: 2019-11-25

## 2019-11-25 VITALS — SYSTOLIC BLOOD PRESSURE: 110 MMHG | DIASTOLIC BLOOD PRESSURE: 70 MMHG

## 2019-11-25 DIAGNOSIS — D48.5 NEOPLASM OF UNCERTAIN BEHAVIOR OF SKIN: ICD-10-CM

## 2019-11-25 DIAGNOSIS — L82.1 OTHER SEBORRHEIC KERATOSIS: ICD-10-CM

## 2019-11-25 DIAGNOSIS — Z91.89 OTHER SPECIFIED PERSONAL RISK FACTORS, NOT ELSEWHERE CLASSIFIED: ICD-10-CM

## 2019-11-25 DIAGNOSIS — D18.01 HEMANGIOMA OF SKIN AND SUBCUTANEOUS TISSUE: ICD-10-CM

## 2019-11-25 DIAGNOSIS — D48.9 NEOPLASM OF UNCERTAIN BEHAVIOR, UNSPECIFIED: ICD-10-CM

## 2019-11-25 PROCEDURE — 99203 OFFICE O/P NEW LOW 30 MIN: CPT | Mod: 25

## 2019-11-25 PROCEDURE — 11103 TANGNTL BX SKIN EA SEP/ADDL: CPT

## 2019-11-25 PROCEDURE — 11102 TANGNTL BX SKIN SINGLE LES: CPT

## 2019-12-31 RX ORDER — CEPHALEXIN 500 MG
1 CAPSULE ORAL
Qty: 0 | Refills: 0 | DISCHARGE
Start: 2019-12-31 | End: 2020-01-06

## 2020-01-02 ENCOUNTER — INPATIENT (INPATIENT)
Facility: HOSPITAL | Age: 85
LOS: 2 days | Discharge: HOME | DRG: 194 | End: 2020-01-05
Attending: STUDENT IN AN ORGANIZED HEALTH CARE EDUCATION/TRAINING PROGRAM | Admitting: HOSPITALIST
Payer: COMMERCIAL

## 2020-01-02 VITALS
OXYGEN SATURATION: 93 % | HEART RATE: 82 BPM | SYSTOLIC BLOOD PRESSURE: 114 MMHG | RESPIRATION RATE: 18 BRPM | DIASTOLIC BLOOD PRESSURE: 72 MMHG | WEIGHT: 104.06 LBS | HEIGHT: 57 IN | TEMPERATURE: 100 F

## 2020-01-02 DIAGNOSIS — I10 ESSENTIAL (PRIMARY) HYPERTENSION: ICD-10-CM

## 2020-01-02 DIAGNOSIS — I51.81 TAKOTSUBO SYNDROME: ICD-10-CM

## 2020-01-02 DIAGNOSIS — Z90.49 ACQUIRED ABSENCE OF OTHER SPECIFIED PARTS OF DIGESTIVE TRACT: Chronic | ICD-10-CM

## 2020-01-02 DIAGNOSIS — Z90.12 ACQUIRED ABSENCE OF LEFT BREAST AND NIPPLE: Chronic | ICD-10-CM

## 2020-01-02 DIAGNOSIS — J18.9 PNEUMONIA, UNSPECIFIED ORGANISM: ICD-10-CM

## 2020-01-02 DIAGNOSIS — Z02.9 ENCOUNTER FOR ADMINISTRATIVE EXAMINATIONS, UNSPECIFIED: ICD-10-CM

## 2020-01-02 DIAGNOSIS — J11.1 INFLUENZA DUE TO UNIDENTIFIED INFLUENZA VIRUS WITH OTHER RESPIRATORY MANIFESTATIONS: ICD-10-CM

## 2020-01-02 DIAGNOSIS — H40.9 UNSPECIFIED GLAUCOMA: ICD-10-CM

## 2020-01-02 LAB
ALBUMIN SERPL ELPH-MCNC: 3.2 G/DL — LOW (ref 3.3–5)
ALP SERPL-CCNC: 78 U/L — SIGNIFICANT CHANGE UP (ref 40–120)
ALT FLD-CCNC: 44 U/L — SIGNIFICANT CHANGE UP (ref 10–45)
ANION GAP SERPL CALC-SCNC: 14 MMOL/L — SIGNIFICANT CHANGE UP (ref 5–17)
APPEARANCE UR: CLEAR — SIGNIFICANT CHANGE UP
APTT BLD: 30.5 SEC — SIGNIFICANT CHANGE UP (ref 27.5–36.3)
AST SERPL-CCNC: 47 U/L — HIGH (ref 10–40)
BACTERIA # UR AUTO: NEGATIVE — SIGNIFICANT CHANGE UP
BASOPHILS # BLD AUTO: 0.02 K/UL — SIGNIFICANT CHANGE UP (ref 0–0.2)
BASOPHILS NFR BLD AUTO: 0.2 % — SIGNIFICANT CHANGE UP (ref 0–2)
BILIRUB SERPL-MCNC: 0.3 MG/DL — SIGNIFICANT CHANGE UP (ref 0.2–1.2)
BILIRUB UR-MCNC: NEGATIVE — SIGNIFICANT CHANGE UP
BUN SERPL-MCNC: 14 MG/DL — SIGNIFICANT CHANGE UP (ref 7–23)
CALCIUM SERPL-MCNC: 8.6 MG/DL — SIGNIFICANT CHANGE UP (ref 8.4–10.5)
CHLORIDE SERPL-SCNC: 99 MMOL/L — SIGNIFICANT CHANGE UP (ref 96–108)
CO2 SERPL-SCNC: 21 MMOL/L — LOW (ref 22–31)
COLOR SPEC: SIGNIFICANT CHANGE UP
CREAT SERPL-MCNC: 0.86 MG/DL — SIGNIFICANT CHANGE UP (ref 0.5–1.3)
DIFF PNL FLD: NEGATIVE — SIGNIFICANT CHANGE UP
EOSINOPHIL # BLD AUTO: 0.01 K/UL — SIGNIFICANT CHANGE UP (ref 0–0.5)
EOSINOPHIL NFR BLD AUTO: 0.1 % — SIGNIFICANT CHANGE UP (ref 0–6)
EPI CELLS # UR: 1 /HPF — SIGNIFICANT CHANGE UP
FLUAV H3 RNA SPEC QL NAA+PROBE: DETECTED
GLUCOSE SERPL-MCNC: 130 MG/DL — HIGH (ref 70–99)
GLUCOSE UR QL: NEGATIVE — SIGNIFICANT CHANGE UP
HCT VFR BLD CALC: 32.1 % — LOW (ref 34.5–45)
HGB BLD-MCNC: 11.1 G/DL — LOW (ref 11.5–15.5)
HYALINE CASTS # UR AUTO: 0 /LPF — SIGNIFICANT CHANGE UP (ref 0–2)
IMM GRANULOCYTES NFR BLD AUTO: 0.8 % — SIGNIFICANT CHANGE UP (ref 0–1.5)
INR BLD: 1.02 RATIO — SIGNIFICANT CHANGE UP (ref 0.88–1.16)
KETONES UR-MCNC: NEGATIVE — SIGNIFICANT CHANGE UP
LACTATE BLDV-MCNC: 1.4 MMOL/L — SIGNIFICANT CHANGE UP (ref 0.7–2)
LEUKOCYTE ESTERASE UR-ACNC: ABNORMAL
LYMPHOCYTES # BLD AUTO: 0.89 K/UL — LOW (ref 1–3.3)
LYMPHOCYTES # BLD AUTO: 9.9 % — LOW (ref 13–44)
MCHC RBC-ENTMCNC: 30.7 PG — SIGNIFICANT CHANGE UP (ref 27–34)
MCHC RBC-ENTMCNC: 34.6 GM/DL — SIGNIFICANT CHANGE UP (ref 32–36)
MCV RBC AUTO: 88.9 FL — SIGNIFICANT CHANGE UP (ref 80–100)
MONOCYTES # BLD AUTO: 0.41 K/UL — SIGNIFICANT CHANGE UP (ref 0–0.9)
MONOCYTES NFR BLD AUTO: 4.6 % — SIGNIFICANT CHANGE UP (ref 2–14)
NEUTROPHILS # BLD AUTO: 7.58 K/UL — HIGH (ref 1.8–7.4)
NEUTROPHILS NFR BLD AUTO: 84.4 % — HIGH (ref 43–77)
NITRITE UR-MCNC: NEGATIVE — SIGNIFICANT CHANGE UP
NRBC # BLD: 0 /100 WBCS — SIGNIFICANT CHANGE UP (ref 0–0)
PH UR: 5.5 — SIGNIFICANT CHANGE UP (ref 5–8)
PLATELET # BLD AUTO: 146 K/UL — LOW (ref 150–400)
POTASSIUM SERPL-MCNC: 4.4 MMOL/L — SIGNIFICANT CHANGE UP (ref 3.5–5.3)
POTASSIUM SERPL-SCNC: 4.4 MMOL/L — SIGNIFICANT CHANGE UP (ref 3.5–5.3)
PROT SERPL-MCNC: 6.8 G/DL — SIGNIFICANT CHANGE UP (ref 6–8.3)
PROT UR-MCNC: NEGATIVE — SIGNIFICANT CHANGE UP
PROTHROM AB SERPL-ACNC: 11.7 SEC — SIGNIFICANT CHANGE UP (ref 10–12.9)
RAPID RVP RESULT: DETECTED
RBC # BLD: 3.61 M/UL — LOW (ref 3.8–5.2)
RBC # FLD: 13.2 % — SIGNIFICANT CHANGE UP (ref 10.3–14.5)
RBC CASTS # UR COMP ASSIST: 1 /HPF — SIGNIFICANT CHANGE UP (ref 0–4)
SODIUM SERPL-SCNC: 134 MMOL/L — LOW (ref 135–145)
SP GR SPEC: 1.01 — SIGNIFICANT CHANGE UP (ref 1.01–1.02)
UROBILINOGEN FLD QL: NEGATIVE — SIGNIFICANT CHANGE UP
WBC # BLD: 8.98 K/UL — SIGNIFICANT CHANGE UP (ref 3.8–10.5)
WBC # FLD AUTO: 8.98 K/UL — SIGNIFICANT CHANGE UP (ref 3.8–10.5)
WBC UR QL: 11 /HPF — HIGH (ref 0–5)

## 2020-01-02 PROCEDURE — 93010 ELECTROCARDIOGRAM REPORT: CPT

## 2020-01-02 PROCEDURE — 99223 1ST HOSP IP/OBS HIGH 75: CPT

## 2020-01-02 PROCEDURE — 99285 EMERGENCY DEPT VISIT HI MDM: CPT

## 2020-01-02 PROCEDURE — 71045 X-RAY EXAM CHEST 1 VIEW: CPT | Mod: 26

## 2020-01-02 RX ORDER — SODIUM CHLORIDE 9 MG/ML
500 INJECTION INTRAMUSCULAR; INTRAVENOUS; SUBCUTANEOUS ONCE
Refills: 0 | Status: COMPLETED | OUTPATIENT
Start: 2020-01-02 | End: 2020-01-02

## 2020-01-02 RX ORDER — LATANOPROST 0.05 MG/ML
1 SOLUTION/ DROPS OPHTHALMIC; TOPICAL AT BEDTIME
Refills: 0 | Status: DISCONTINUED | OUTPATIENT
Start: 2020-01-02 | End: 2020-01-05

## 2020-01-02 RX ORDER — VANCOMYCIN HCL 1 G
1000 VIAL (EA) INTRAVENOUS ONCE
Refills: 0 | Status: COMPLETED | OUTPATIENT
Start: 2020-01-02 | End: 2020-01-02

## 2020-01-02 RX ORDER — IPRATROPIUM/ALBUTEROL SULFATE 18-103MCG
3 AEROSOL WITH ADAPTER (GRAM) INHALATION ONCE
Refills: 0 | Status: COMPLETED | OUTPATIENT
Start: 2020-01-02 | End: 2020-01-02

## 2020-01-02 RX ORDER — CEFEPIME 1 G/1
2000 INJECTION, POWDER, FOR SOLUTION INTRAMUSCULAR; INTRAVENOUS ONCE
Refills: 0 | Status: COMPLETED | OUTPATIENT
Start: 2020-01-02 | End: 2020-01-02

## 2020-01-02 RX ORDER — ENOXAPARIN SODIUM 100 MG/ML
40 INJECTION SUBCUTANEOUS DAILY
Refills: 0 | Status: DISCONTINUED | OUTPATIENT
Start: 2020-01-02 | End: 2020-01-05

## 2020-01-02 RX ORDER — ALBUTEROL 90 UG/1
1.25 AEROSOL, METERED ORAL EVERY 6 HOURS
Refills: 0 | Status: DISCONTINUED | OUTPATIENT
Start: 2020-01-02 | End: 2020-01-05

## 2020-01-02 RX ORDER — BRIMONIDINE TARTRATE 2 MG/MG
1 SOLUTION/ DROPS OPHTHALMIC
Refills: 0 | Status: DISCONTINUED | OUTPATIENT
Start: 2020-01-02 | End: 2020-01-05

## 2020-01-02 RX ORDER — ASPIRIN/CALCIUM CARB/MAGNESIUM 324 MG
81 TABLET ORAL DAILY
Refills: 0 | Status: DISCONTINUED | OUTPATIENT
Start: 2020-01-02 | End: 2020-01-05

## 2020-01-02 RX ORDER — POLYETHYLENE GLYCOL 3350 17 G/17G
17 POWDER, FOR SOLUTION ORAL DAILY
Refills: 0 | Status: DISCONTINUED | OUTPATIENT
Start: 2020-01-02 | End: 2020-01-05

## 2020-01-02 RX ORDER — ACETAMINOPHEN 500 MG
650 TABLET ORAL EVERY 6 HOURS
Refills: 0 | Status: DISCONTINUED | OUTPATIENT
Start: 2020-01-02 | End: 2020-01-05

## 2020-01-02 RX ORDER — ACETAMINOPHEN 500 MG
650 TABLET ORAL ONCE
Refills: 0 | Status: COMPLETED | OUTPATIENT
Start: 2020-01-02 | End: 2020-01-02

## 2020-01-02 RX ADMIN — SODIUM CHLORIDE 500 MILLILITER(S): 9 INJECTION INTRAMUSCULAR; INTRAVENOUS; SUBCUTANEOUS at 18:38

## 2020-01-02 RX ADMIN — Medication 650 MILLIGRAM(S): at 23:32

## 2020-01-02 RX ADMIN — ALBUTEROL 1.25 MILLIGRAM(S): 90 AEROSOL, METERED ORAL at 23:20

## 2020-01-02 RX ADMIN — Medication 100 MILLIGRAM(S): at 23:01

## 2020-01-02 RX ADMIN — CEFEPIME 100 MILLIGRAM(S): 1 INJECTION, POWDER, FOR SOLUTION INTRAMUSCULAR; INTRAVENOUS at 18:37

## 2020-01-02 RX ADMIN — Medication 650 MILLIGRAM(S): at 23:02

## 2020-01-02 RX ADMIN — Medication 3 MILLILITER(S): at 18:37

## 2020-01-02 RX ADMIN — ENOXAPARIN SODIUM 40 MILLIGRAM(S): 100 INJECTION SUBCUTANEOUS at 23:01

## 2020-01-02 RX ADMIN — Medication 250 MILLIGRAM(S): at 20:02

## 2020-01-02 NOTE — H&P ADULT - NSHPPHYSICALEXAM_GEN_ALL_CORE
T(C): 36.5 (01-02-20 @ 21:34), Max: 39.4 (01-02-20 @ 18:00)  HR: 61 (01-02-20 @ 21:34) (61 - 82)  BP: 129/87 (01-02-20 @ 21:34) (109/52 - 130/62)  RR: 20 (01-02-20 @ 21:34) (18 - 20)  SpO2: 93% (01-02-20 @ 21:34) (93% - 96%)    Gen: (fe)male in NAD, appears comfortable, no diaphoresis  HEENT: NCAT, MMM, neck soft and supple  CV: +S1/S2, no m/r/g  Resp: CTAB, no w/r/r  GI: normoactive BS, soft, NTND, no rebounding/guarding  Ext: No LE edema, extremities appear warm and well perfused   Neuro: AOx3, no focal deficits, CNII-XII grossly intact  Psych: No SI/HI/AVH, appropriate affect  Skin: no petechiae, ecchymosis or maculopapular rash noted T(C): 36.5 (01-02-20 @ 21:34), Max: 39.4 (01-02-20 @ 18:00)  HR: 61 (01-02-20 @ 21:34) (61 - 82)  BP: 129/87 (01-02-20 @ 21:34) (109/52 - 130/62)  RR: 20 (01-02-20 @ 21:34) (18 - 20)  SpO2: 93% (01-02-20 @ 21:34) (93% - 96%)    Gen: female in NAD, appears comfortable, no diaphoresis  HEENT: NCAT, MMM, neck soft and supple  CV: +S1/S2, no m/r/g  Resp: coarse breath sounds throughout, faint expiratory wheezing on expiration  GI: normoactive BS, soft, NTND, no rebounding/guarding  Ext: No LE edema, extremities appear warm and well perfused   Neuro: AOx3, no focal deficits, CNII-XII grossly intact  Psych: No SI/HI/AVH, appropriate affect  Skin: no petechiae, ecchymosis or maculopapular rash noted

## 2020-01-02 NOTE — H&P ADULT - PROBLEM SELECTOR PLAN 5
Transitions of Care Status:  1.  Name of PCP: [facility]  2.  PCP Contacted on Admission: [ ] Y    [ ] N    3.  PCP contacted at Discharge: [ ] Y    [ ] N    [ ] N/A  4.  Post-Discharge Appointment Date and Location:  5.  Summary of Handoff given to PCP:

## 2020-01-02 NOTE — PATIENT PROFILE ADULT - ABILITY TO HEAR (WITH HEARING AID OR HEARING APPLIANCE IF NORMALLY USED):
Mildly to Moderately Impaired: difficulty hearing in some environments or speaker may need to increase volume or speak distinctly/[place] - better in right ear

## 2020-01-02 NOTE — ED ADULT NURSE REASSESSMENT NOTE - NS ED NURSE REASSESS COMMENT FT1
Received report from Silvana CASTILLO. Patient resting comfortably in stretcher. A&Ox4. Vital signs are stable. Patient in no acute distress. Heart sounds heard upon auscultation. Crackles heard in bilateral lung fields. Patient pending inpatient bed assignment. Plan of care discussed. Safety and comfort measures maintained. Will continue to monitor.

## 2020-01-02 NOTE — H&P ADULT - PROBLEM SELECTOR PLAN 1
Transitions of Care Status:  1.  Name of PCP: [facility]  2.  PCP Contacted on Admission: [ ] Y    [ ] N    3.  PCP contacted at Discharge: [ ] Y    [ ] N    [ ] N/A  4.  Post-Discharge Appointment Date and Location:  5.  Summary of Handoff given to PCP: -Treat with a prolonged course of Tamiflu for at least seven days (patient had taken two days at home) with medication adjusted for creatinine clearance  -Symptomatic treatment: Robitussin PRN cough, standing Benzonatate (patient may refuse) and Albuterol q6 hours standing (patient may refuse)  -Hold antibiotics for now, but send procalcitonin level in AM  -If patient continues to worsen respiratory wise, would consider CT Chest and treating for HAP (possible developing consolidation not seen on CXR)  -Nasal cannula PRN to maintain saturation if <88%  -Tylenol PRN fever

## 2020-01-02 NOTE — ED ADULT NURSE NOTE - NSIMPLEMENTINTERV_GEN_ALL_ED
Implemented All Fall with Harm Risk Interventions:  [place] to call system. Call bell, personal items and telephone within reach. Instruct patient to call for assistance. Room bathroom lighting operational. Non-slip footwear when patient is off stretcher. Physically safe environment: no spills, clutter or unnecessary equipment. Stretcher in lowest position, wheels locked, appropriate side rails in place. Provide visual cue, wrist band, yellow gown, etc. Monitor gait and stability. Monitor for mental status changes and reorient to person, place, and time. Review medications for side effects contributing to fall risk. Reinforce activity limits and safety measures with patient and family. Provide visual clues: red socks.

## 2020-01-02 NOTE — ED PROVIDER NOTE - PHYSICAL EXAMINATION
General: No acute distress.  Heart: Borderline tachycardic, no murmurs.   Lungs: Inspiratory mild wheezing to the right lung with mild rhonchi to the right base.   Abdomen: Soft, non-tender, non-distended. No organomegaly.  Eyes: PERRL, EOMI.  Neuro: AAOx3, no focal motor or sensory deficits. CN II-XII intact.  Extremities: LUE edema at base, 2+ DP and radial pulses.  Skin: No rashes or lesions.

## 2020-01-02 NOTE — H&P ADULT - NSHPREVIEWOFSYSTEMS_GEN_ALL_CORE
Gen: no changes in weight, fatigue, night sweats, appetite, or fever  Eyes: no changes in vision, diplopia, or floaters  ENT: no epistaxis, sinus pain, gingival bleeding, odynophagia or dysphagia  CV: no CP, SOB, PND, orthopnea, LOC, or palpitations  Resp: no cough, wheezing, or hemoptysis  GI: no abdominal pain, dyspepsia, nausea, vomiting, diarrhea, constipation, hematemesis, hematochezia, or melena  : no dysuria, polyuria, or hematuria  MSK: no arthralgias or joint swelling   Neuro: no gross sensory changes, numbness, focal deficits  Psych: no depression, changes in sleep, changes in concentration, or lack of energy  Heme/Onc: no purpura, petechiae or night sweats  Skin: no pruritus, hair loss or skin lesions  All: no photosensitivity, no complaints of anaphylaxis (SOB, throat swelling) Gen: +fever, +increasing confusion  Eyes: no changes in vision, diplopia, or floaters  ENT: no epistaxis, sinus pain, gingival bleeding, odynophagia or dysphagia  CV: no CP, SOB, PND, orthopnea, LOC, or palpitations  Resp: +cough, no hemoptysis   GI: no abdominal pain, dyspepsia, nausea, vomiting, diarrhea, constipation, hematemesis, hematochezia, or melena  : no dysuria, polyuria, or hematuria  MSK: no arthralgias or joint swelling   Neuro: no gross sensory changes, numbness, focal deficits  Psych: no depression, changes in sleep, changes in concentration, or lack of energy  Heme/Onc: no purpura, petechiae or night sweats  Skin: no pruritus, hair loss or skin lesions  All: no photosensitivity, no complaints of anaphylaxis (SOB, throat swelling)

## 2020-01-02 NOTE — ED PROVIDER NOTE - CLINICAL SUMMARY MEDICAL DECISION MAKING FREE TEXT BOX
88 year old female with pmhx CHF presents to the ED c/o cough x4 days with AMS developing today which has improved. Borderline febrile, will perform rectal temp, septic workup for possible PNA, empirically treat with abx as pt was hospitalized last week, flu swab, Duoneb for reactive airway. Will not give 30ccs/kg as pt has CHF. Will give Cefapime as pt tolerated Keflex this week. See attending attestation for MDM summary

## 2020-01-02 NOTE — PATIENT PROFILE ADULT - VISION (WITH CORRECTIVE LENSES IF THE PATIENT USUALLY WEARS THEM):
Partially impaired: cannot see medication labels or newsprint, but can see obstacles in path, and the surrounding layout; can count fingers at arm's length/Reading glasses at bedside

## 2020-01-02 NOTE — ED ADULT NURSE REASSESSMENT NOTE - NS ED NURSE REASSESS COMMENT FT1
Patient states taking 2 extra strength tylenols at around 17:00 prior to coming in. Dr. Sena made aware and notified. Per Dr. Sena would like to wait to see creatinine levels before ordering medication for fever.

## 2020-01-02 NOTE — ED PROVIDER NOTE - ATTENDING CONTRIBUTION TO CARE
88 year old female with pmhx CHF presents to the ED c/o cough x4 days with AMS developing today which has improved. Borderline febrile, will perform rectal temp, septic workup for possible PNA, empirically treat with abx as pt was hospitalized last week, flu swab, Duoneb for reactive airway. Will not give 30ccs/kg as pt has CHF. Will give Cefepime as pt tolerated Keflex this week.

## 2020-01-02 NOTE — H&P ADULT - NSICDXPASTMEDICALHX_GEN_ALL_CORE_FT
PAST MEDICAL HISTORY:  Breast cancer     Glaucoma     HTN (hypertension)     Takotsubo cardiomyopathy

## 2020-01-02 NOTE — H&P ADULT - HISTORY OF PRESENT ILLNESS
88F with PMHx of Takotsubo Cardiomyopathy (recovered EF), HTN, Glaucoma, and Breast Cancer (post-left sided mastectomy decades prior) brought in by family for cough and increasing confusion for several days. Patient recently admitted at [facility] and treated for left arm cellulitis in the past week and was recently discharged three days prior to admission. Patient upon discharge began to experience non-productive cough. At time of discharge she was given Keflex to complete course and Tamiflu. The son was at bedside and unable to offer reason for Tamiflu. They are unsure if she tested positive for influenza there or if it was given as post-exposure prophylaxis. Patient took two days of the TamiFlu. While at home patient began to experience fever and productive cough (she states it's mucous-like). In addition, family states that patient was making less sense; thus, they decided to bring patient in today. While in the ED patient given Cefepime, Vancomycin, Tylenol and DuoNeb. Patient tested positive for Influenza and started on Tamiflu. When seen by me patient denies shortness of breath, but is endorsing a cough. She denies myalgias, nasal congestion, sore throat, or lower extremity edema. Patient was on 2 L NC and saturating at 98%. Nasal cannula removed by me and patient maintained saturations in the low 90s. Patient admitted for influenza.

## 2020-01-02 NOTE — H&P ADULT - ASSESSMENT
88F with PMHx of Takotsubo Cardiomyopathy (recovered EF), HTN, Glaucoma, and Breast Cancer (post-left sided mastectomy decades prior) brought in by family for cough and increasing confusion for several days. Patient AOx3 upon evaluation. She was recently admitted at [facility] for cellulitis of left arm (now resolved, discharged two days prior to admission) and given Tamiflu for cough which she developed upon discharge. Patient took it for two days, but is presenting today because of fever and continued cough and reported confusion per family. Unsure if patient had tested positive for influenza at [facility] or if TamiFlu given as post-exposure prophylaxis. Patient tested positive for influenza with CXR showing no consolidation.

## 2020-01-02 NOTE — PATIENT PROFILE ADULT - NSPROMUTANXFEARADDRESSFT_GEN_A_NUR
Keep patient informed on any changes to daily medication regimen and provide explanation regarding lab testing and imaging

## 2020-01-02 NOTE — PATIENT PROFILE ADULT - NSPROMUTINFOINDIVIDFT_GEN_A_NUR
Provide adequate reorientation for patient's recent spells of confusion, reduce fever, and aid with ADL's and ambulation due to lethargy

## 2020-01-02 NOTE — ED PROVIDER NOTE - OBJECTIVE STATEMENT
88 year old female with pmhx CHF, HTN, HLD, breast CA s/p surgery presents to the ED c/o AMS today. +fever, chills x4 days. Hospitalized last week for cellulitis to the E, treated with IV abx. DCed on Keflex and Tamiflu x3 days ago. Tamiflu prescribed for developing cough while in hospital, no testing for flu. Family members at bedside state pt was lethargic and confused this afternoon. Denies vomiting, diarrhea, CP, abdominal pain. Denies recent travel. Smoked in the past. Cellulitis has since resolved.

## 2020-01-02 NOTE — H&P ADULT - NSICDXPASTSURGICALHX_GEN_ALL_CORE_FT
PAST SURGICAL HISTORY:  H/O left mastectomy     History of appendectomy     History of cholecystectomy

## 2020-01-02 NOTE — PATIENT PROFILE ADULT - NSPROMUTPARTICIPCAREFT_GEN_A_NUR
Provide frequent reorientation when necessary, provide help with food orders, and assist with goals of care

## 2020-01-02 NOTE — ED ADULT NURSE NOTE - OBJECTIVE STATEMENT
87 yo female presents to the ED via EMS from home complaining of weakness. Patient was recently at [facility] on Sunday for cellulitis of the L arm. Had flu like symptoms, was discharged with tamiflu on Tuesday. Today, per family at bedside, was very lethargic and hard to wake up from a nap. Patient is speaking in full coherent sentences, appears comfortable in bed. Denies headache, dizziness, vision changes, chest pain, shortness of breath, abdominal pain, nausea, vomiting, diarrhea, chills, dysuria, hematuria, recent illness travel or fall.

## 2020-01-02 NOTE — ED ADULT NURSE NOTE - PMH
CAD (coronary artery disease)    CHF (congestive heart failure)    HLD (hyperlipidemia)    HTN (hypertension)

## 2020-01-03 ENCOUNTER — TRANSCRIPTION ENCOUNTER (OUTPATIENT)
Age: 85
End: 2020-01-03

## 2020-01-03 LAB
ALBUMIN SERPL ELPH-MCNC: 3.3 G/DL — SIGNIFICANT CHANGE UP (ref 3.3–5)
ALP SERPL-CCNC: 75 U/L — SIGNIFICANT CHANGE UP (ref 40–120)
ALT FLD-CCNC: 37 U/L — SIGNIFICANT CHANGE UP (ref 10–45)
ANION GAP SERPL CALC-SCNC: 9 MMOL/L — SIGNIFICANT CHANGE UP (ref 5–17)
AST SERPL-CCNC: 43 U/L — HIGH (ref 10–40)
BILIRUB SERPL-MCNC: 0.3 MG/DL — SIGNIFICANT CHANGE UP (ref 0.2–1.2)
BUN SERPL-MCNC: 14 MG/DL — SIGNIFICANT CHANGE UP (ref 7–23)
CALCIUM SERPL-MCNC: 9.4 MG/DL — SIGNIFICANT CHANGE UP (ref 8.4–10.5)
CHLORIDE SERPL-SCNC: 100 MMOL/L — SIGNIFICANT CHANGE UP (ref 96–108)
CO2 SERPL-SCNC: 25 MMOL/L — SIGNIFICANT CHANGE UP (ref 22–31)
CREAT SERPL-MCNC: 0.77 MG/DL — SIGNIFICANT CHANGE UP (ref 0.5–1.3)
CULTURE RESULTS: SIGNIFICANT CHANGE UP
GLUCOSE SERPL-MCNC: 99 MG/DL — SIGNIFICANT CHANGE UP (ref 70–99)
GRAM STN FLD: SIGNIFICANT CHANGE UP
HCT VFR BLD CALC: 33.5 % — LOW (ref 34.5–45)
HGB BLD-MCNC: 10.8 G/DL — LOW (ref 11.5–15.5)
MAGNESIUM SERPL-MCNC: 2 MG/DL — SIGNIFICANT CHANGE UP (ref 1.6–2.6)
MCHC RBC-ENTMCNC: 29.4 PG — SIGNIFICANT CHANGE UP (ref 27–34)
MCHC RBC-ENTMCNC: 32.2 GM/DL — SIGNIFICANT CHANGE UP (ref 32–36)
MCV RBC AUTO: 91.3 FL — SIGNIFICANT CHANGE UP (ref 80–100)
NRBC # BLD: 0 /100 WBCS — SIGNIFICANT CHANGE UP (ref 0–0)
PHOSPHATE SERPL-MCNC: 3.2 MG/DL — SIGNIFICANT CHANGE UP (ref 2.5–4.5)
PLATELET # BLD AUTO: 160 K/UL — SIGNIFICANT CHANGE UP (ref 150–400)
POTASSIUM SERPL-MCNC: 4 MMOL/L — SIGNIFICANT CHANGE UP (ref 3.5–5.3)
POTASSIUM SERPL-SCNC: 4 MMOL/L — SIGNIFICANT CHANGE UP (ref 3.5–5.3)
PROCALCITONIN SERPL-MCNC: 0.44 NG/ML — HIGH (ref 0.02–0.1)
PROT SERPL-MCNC: 6.9 G/DL — SIGNIFICANT CHANGE UP (ref 6–8.3)
RBC # BLD: 3.67 M/UL — LOW (ref 3.8–5.2)
RBC # FLD: 13.1 % — SIGNIFICANT CHANGE UP (ref 10.3–14.5)
SODIUM SERPL-SCNC: 134 MMOL/L — LOW (ref 135–145)
SPECIMEN SOURCE: SIGNIFICANT CHANGE UP
SPECIMEN SOURCE: SIGNIFICANT CHANGE UP
WBC # BLD: 9.23 K/UL — SIGNIFICANT CHANGE UP (ref 3.8–10.5)
WBC # FLD AUTO: 9.23 K/UL — SIGNIFICANT CHANGE UP (ref 3.8–10.5)

## 2020-01-03 PROCEDURE — 99233 SBSQ HOSP IP/OBS HIGH 50: CPT | Mod: GC

## 2020-01-03 PROCEDURE — 71046 X-RAY EXAM CHEST 2 VIEWS: CPT | Mod: 26

## 2020-01-03 RX ORDER — BIMATOPROST 0.3 MG/ML
1 SOLUTION/ DROPS OPHTHALMIC
Qty: 0 | Refills: 0 | DISCHARGE

## 2020-01-03 RX ORDER — POLYETHYLENE GLYCOL 3350 17 G/17G
17 POWDER, FOR SOLUTION ORAL
Qty: 0 | Refills: 0 | DISCHARGE

## 2020-01-03 RX ORDER — SENNA PLUS 8.6 MG/1
1 TABLET ORAL
Qty: 30 | Refills: 0

## 2020-01-03 RX ORDER — ASPIRIN/CALCIUM CARB/MAGNESIUM 324 MG
1 TABLET ORAL
Qty: 0 | Refills: 0 | DISCHARGE

## 2020-01-03 RX ORDER — OMEGA-3 ACID ETHYL ESTERS 1 G
0 CAPSULE ORAL
Qty: 0 | Refills: 0 | DISCHARGE

## 2020-01-03 RX ORDER — BRIMONIDINE TARTRATE, TIMOLOL MALEATE 2; 5 MG/ML; MG/ML
1 SOLUTION/ DROPS OPHTHALMIC
Qty: 0 | Refills: 0 | DISCHARGE

## 2020-01-03 RX ORDER — CALCIUM CARBONATE 500(1250)
0 TABLET ORAL
Qty: 0 | Refills: 0 | DISCHARGE

## 2020-01-03 RX ADMIN — BRIMONIDINE TARTRATE 1 DROP(S): 2 SOLUTION/ DROPS OPHTHALMIC at 19:06

## 2020-01-03 RX ADMIN — ALBUTEROL 1.25 MILLIGRAM(S): 90 AEROSOL, METERED ORAL at 17:36

## 2020-01-03 RX ADMIN — Medication 100 MILLIGRAM(S): at 19:05

## 2020-01-03 RX ADMIN — Medication 30 MILLIGRAM(S): at 05:04

## 2020-01-03 RX ADMIN — ALBUTEROL 1.25 MILLIGRAM(S): 90 AEROSOL, METERED ORAL at 05:35

## 2020-01-03 RX ADMIN — Medication 30 MILLIGRAM(S): at 19:07

## 2020-01-03 RX ADMIN — LATANOPROST 1 DROP(S): 0.05 SOLUTION/ DROPS OPHTHALMIC; TOPICAL at 00:20

## 2020-01-03 RX ADMIN — LATANOPROST 1 DROP(S): 0.05 SOLUTION/ DROPS OPHTHALMIC; TOPICAL at 21:06

## 2020-01-03 RX ADMIN — BRIMONIDINE TARTRATE 1 DROP(S): 2 SOLUTION/ DROPS OPHTHALMIC at 05:04

## 2020-01-03 RX ADMIN — ALBUTEROL 1.25 MILLIGRAM(S): 90 AEROSOL, METERED ORAL at 23:20

## 2020-01-03 RX ADMIN — Medication 100 MILLIGRAM(S): at 05:04

## 2020-01-03 RX ADMIN — Medication 81 MILLIGRAM(S): at 12:21

## 2020-01-03 RX ADMIN — ENOXAPARIN SODIUM 40 MILLIGRAM(S): 100 INJECTION SUBCUTANEOUS at 12:22

## 2020-01-03 RX ADMIN — ALBUTEROL 1.25 MILLIGRAM(S): 90 AEROSOL, METERED ORAL at 11:53

## 2020-01-03 NOTE — PROGRESS NOTE ADULT - ASSESSMENT
88F with PMHx of Takotsubo Cardiomyopathy (recovered EF), HTN, Glaucoma, and Breast Cancer (post-left sided mastectomy decades prior) brought in by family for cough and increasing confusion for several days. Patient AOx3 upon evaluation. She was recently admitted at [facility] for cellulitis of left arm (now resolved, discharged two days prior to admission) and given Tamiflu for cough which she developed upon discharge. Patient took it for two days, but is presenting today because of fever and continued cough and reported confusion per family. Unsure if patient had tested positive for influenza at [facility] or if TamiFlu given as post-exposure prophylaxis. Patient tested positive for influenza with CXR showing no consolidation. 88F with PMHx of Takotsubo Cardiomyopathy (recovered EF), HTN, Glaucoma, and Breast Cancer (post-left sided mastectomy decades prior) brought in by family for cough and increasing confusion for several days. Patient AOx3 upon evaluation. She was recently admitted at [facility] for cellulitis of left arm (now resolved, discharged two days prior to admission) and given Tamiflu for cough which she developed upon discharge. Patient took it for two days, but is presenting today because of fever and continued cough and reported confusion per family. Unsure if patient had tested positive for influenza at [facility] or if TamiFlu given as post-exposure prophylaxis. Patient tested positive for influenza with CXR showing no clear consolidation.

## 2020-01-03 NOTE — DISCHARGE NOTE PROVIDER - NSDCFUADDINST_GEN_ALL_CORE_FT
Please follow up with your primary care doctor in 1-3 days.    Please follow up with any specialists you have within 1 week.

## 2020-01-03 NOTE — PROGRESS NOTE ADULT - PROBLEM SELECTOR PLAN 5
Transitions of Care Status:  1.  Name of PCP: [facility]  2.  PCP Contacted on Admission: [ ] Y    [ ] N    3.  PCP contacted at Discharge: [ ] Y    [ ] N    [ ] N/A  4.  Post-Discharge Appointment Date and Location:  5.  Summary of Handoff given to PCP: Transitions of Care Status:  1.  Name of PCP: [facility]  2.  PCP Contacted on Admission: [ ] Y    [x ] N    3.  PCP contacted at Discharge: [ ] Y    [ ] N    [ ] N/A  4.  Post-Discharge Appointment Date and Location:  5.  Summary of Handoff given to PCP:

## 2020-01-03 NOTE — DISCHARGE NOTE PROVIDER - NSDCMRMEDTOKEN_GEN_ALL_CORE_FT
Aspirin Enteric Coated 81 mg oral delayed release tablet: 1 tab(s) orally once a day  bimatoprost 0.01% ophthalmic solution: 1 drop(s) in the left eye once a day (at bedtime)  brimonidine-timolol 0.2%-0.5% ophthalmic solution: 1 drop(s) in the left eye 2 times a day  Fish Oil:   losartan 25 mg oral tablet: 1 tab(s) orally once a day  MiraLax oral powder for reconstitution: 17 gram(s) orally , As Needed  multivitamin:   os-randy:   pravastatin 40 mg oral tablet: 1 tab(s) orally once a day  senna 8.6 mg oral tablet: 1 tab(s) orally once a day (at bedtime), As Needed  Tamiflu 75 mg oral capsule: 1 cap(s) orally 2 times a day acetaminophen 500 mg oral tablet: 1 tab(s) orally every 6 hours   Aspirin Enteric Coated 81 mg oral delayed release tablet: 1 tab(s) orally once a day  bimatoprost 0.01% ophthalmic solution: 1 drop(s) in the left eye once a day (at bedtime)  brimonidine-timolol 0.2%-0.5% ophthalmic solution: 1 drop(s) in the left eye 2 times a day  Fish Oil:   MiraLax oral powder for reconstitution: 17 gram(s) orally , As Needed  Mucinex 600 mg oral tablet, extended release: 1 tab(s) orally 2 times a day   multivitamin:   os-randy:   oseltamivir 30 mg oral capsule: 1 cap(s) orally 2 times a day  pravastatin 40 mg oral tablet: 1 tab(s) orally once a day  senna 8.6 mg oral tablet: 1 tab(s) orally once a day (at bedtime), As Needed  Tessalon Perles 100 mg oral capsule: 1 cap(s) orally every 8 hours  Ventolin HFA 90 mcg/inh inhalation aerosol: 2 puff(s) inhaled every 6 hours

## 2020-01-03 NOTE — PROGRESS NOTE ADULT - SUBJECTIVE AND OBJECTIVE BOX
Patient is a 88y old  Female who presents with a chief complaint of Cough & not making sense (02 Jan 2020 21:36)      INTERVAL HPI/OVERNIGHT EVENTS:      REVIEW OF SYSTEMS:    CONSTITUTIONAL: No weakness, fevers or chills  EYES/ENT: No visual changes;  No vertigo or throat pain   NECK: No pain or stiffness  RESPIRATORY: No cough, wheezing, hemoptysis; No shortness of breath  CARDIOVASCULAR: No chest pain or palpitations  GASTROINTESTINAL: No abdominal or epigastric pain. No nausea, vomiting, or hematemesis; No diarrhea or constipation. No melena or hematochezia.  GENITOURINARY: No dysuria, frequency or hematuria  NEUROLOGICAL: No numbness or weakness  All other review of systems is negative unless indicated above.    FAMILY HISTORY:  FHx: lung cancer    T(C): 36.7 (01-03-20 @ 04:26), Max: 39.4 (01-02-20 @ 18:00)  HR: 68 (01-03-20 @ 05:36) (61 - 82)  BP: 125/64 (01-03-20 @ 04:26) (109/52 - 168/87)  RR: 18 (01-03-20 @ 04:26) (18 - 20)  SpO2: 94% (01-03-20 @ 05:36) (93% - 96%)  Wt(kg): --Vital Signs Last 24 Hrs  T(C): 36.7 (03 Jan 2020 04:26), Max: 39.4 (02 Jan 2020 18:00)  T(F): 98.1 (03 Jan 2020 04:26), Max: 102.9 (02 Jan 2020 18:00)  HR: 68 (03 Jan 2020 05:36) (61 - 82)  BP: 125/64 (03 Jan 2020 04:26) (109/52 - 168/87)  BP(mean): --  RR: 18 (03 Jan 2020 04:26) (18 - 20)  SpO2: 94% (03 Jan 2020 05:36) (93% - 96%)    PHYSICAL EXAM:  	Gen: female in NAD, appears comfortable, no diaphoresis  	HEENT: NCAT, MMM, neck soft and supple  	CV: +S1/S2, no m/r/g  	Resp: coarse breath sounds throughout, faint expiratory wheezing on expiration  	GI: normoactive BS, soft, NTND, no rebounding/guarding  	Ext: No LE edema, extremities appear warm and well perfused   	Neuro: AOx3, no focal deficits, CNII-XII grossly intact  	Psych: No SI/HI/AVH, appropriate affect  Skin: no petechiae, ecchymosis or maculopapular rash noted    Consultant(s) Notes Reviewed:  [x ] YES  [ ] NO  Care Discussed with Consultants/Other Providers [ x] YES  [ ] NO    LABS:                        10.8   9.23  )-----------( 160      ( 03 Jan 2020 06:34 )             33.5     03 Jan 2020 06:34    134    |  100    |  14     ----------------------------<  99     4.0     |  25     |  0.77     Ca    9.4        03 Jan 2020 06:34    TPro  6.9    /  Alb  3.3    /  TBili  0.3    /  DBili  x      /  AST  43     /  ALT  37     /  AlkPhos  75     03 Jan 2020 06:34    PT/INR - ( 02 Jan 2020 18:25 )   PT: 11.7 sec;   INR: 1.02 ratio         PTT - ( 02 Jan 2020 18:25 )  PTT:30.5 sec  CAPILLARY BLOOD GLUCOSE        BLOOD CULTURE      RADIOLOGY & ADDITIONAL TESTS:    Imaging Personally Reviewed:  [ ] YES  [ ] NO  acetaminophen   Tablet .. 650 milliGRAM(s) Oral every 6 hours PRN  ALBUTerol   0.042% 1.25 milliGRAM(s) Nebulizer every 6 hours  aspirin enteric coated 81 milliGRAM(s) Oral daily  benzonatate 100 milliGRAM(s) Oral every 8 hours  brimonidine 0.2% Ophthalmic Solution 1 Drop(s) Left EYE two times a day  enoxaparin Injectable 40 milliGRAM(s) SubCutaneous daily  guaiFENesin   Syrup  (Sugar-Free) 100 milliGRAM(s) Oral every 6 hours PRN  latanoprost 0.005% Ophthalmic Solution 1 Drop(s) Left EYE at bedtime  oseltamivir 30 milliGRAM(s) Oral two times a day  polyethylene glycol 3350 17 Gram(s) Oral daily PRN      HEALTH ISSUES - PROBLEM Dx:  Glaucoma: Glaucoma  HTN (hypertension): HTN (hypertension)  Takotsubo cardiomyopathy: Takotsubo cardiomyopathy  Influenza: Influenza  Discharge planning issues: Discharge planning issues Patient is a 88y old  Female who presents with a chief complaint of Cough & not making sense (02 Jan 2020 21:36)      INTERVAL HPI/OVERNIGHT EVENTS: ELENA. This am, pt states she still has productive cough. States family will sop by later to see her. Very appreciative of care provided here.       REVIEW OF SYSTEMS:    CONSTITUTIONAL: No weakness, fevers or chills  EYES/ENT: No visual changes;  No vertigo or throat pain   NECK: No pain or stiffness  RESPIRATORY: + productive cough, + wheezing, no hemoptysis; No shortness of breath  CARDIOVASCULAR: No chest pain or palpitations  GASTROINTESTINAL: No abdominal or epigastric pain. No nausea, vomiting, or hematemesis; No diarrhea or constipation. No melena or hematochezia.  GENITOURINARY: No dysuria, frequency or hematuria  NEUROLOGICAL: No numbness or weakness  All other review of systems is negative unless indicated above.    FAMILY HISTORY:  FHx: lung cancer    T(C): 36.7 (01-03-20 @ 04:26), Max: 39.4 (01-02-20 @ 18:00)  HR: 68 (01-03-20 @ 05:36) (61 - 82)  BP: 125/64 (01-03-20 @ 04:26) (109/52 - 168/87)  RR: 18 (01-03-20 @ 04:26) (18 - 20)  SpO2: 94% (01-03-20 @ 05:36) (93% - 96%)  Wt(kg): --Vital Signs Last 24 Hrs  T(C): 36.7 (03 Jan 2020 04:26), Max: 39.4 (02 Jan 2020 18:00)  T(F): 98.1 (03 Jan 2020 04:26), Max: 102.9 (02 Jan 2020 18:00)  HR: 68 (03 Jan 2020 05:36) (61 - 82)  BP: 125/64 (03 Jan 2020 04:26) (109/52 - 168/87)  BP(mean): --  RR: 18 (03 Jan 2020 04:26) (18 - 20)  SpO2: 94% (03 Jan 2020 05:36) (93% - 96%)    PHYSICAL EXAM:  	Gen: female in NAD, appears comfortable, no diaphoresis  	HEENT: NCAT, MMM, neck soft and supple  	CV: +S1/S2, no m/r/g  	Resp: coarse breath sounds throughout, especially on upper lobes, faint expiratory wheezing on expiration appreciated in upper lobes b/l  	GI: normoactive BS, soft, NTND, no rebounding/guarding  	Ext: No LE edema, extremities appear warm and well perfused   	Neuro: AOx3, no focal deficits, CNII-XII grossly intact  	Psych: No SI/HI/AVH, appropriate affect  Skin: no petechiae, ecchymosis or maculopapular rash noted    Consultant(s) Notes Reviewed:  [x ] YES  [ ] NO  Care Discussed with Consultants/Other Providers [ x] YES  [ ] NO    LABS:                        10.8   9.23  )-----------( 160      ( 03 Jan 2020 06:34 )             33.5     03 Jan 2020 06:34    134    |  100    |  14     ----------------------------<  99     4.0     |  25     |  0.77     Ca    9.4        03 Jan 2020 06:34    TPro  6.9    /  Alb  3.3    /  TBili  0.3    /  DBili  x      /  AST  43     /  ALT  37     /  AlkPhos  75     03 Jan 2020 06:34    PT/INR - ( 02 Jan 2020 18:25 )   PT: 11.7 sec;   INR: 1.02 ratio         PTT - ( 02 Jan 2020 18:25 )  PTT:30.5 sec  CAPILLARY BLOOD GLUCOSE        BLOOD CULTURE      RADIOLOGY & ADDITIONAL TESTS:    Imaging Personally Reviewed:  [ ] YES  [ ] NO  acetaminophen   Tablet .. 650 milliGRAM(s) Oral every 6 hours PRN  ALBUTerol   0.042% 1.25 milliGRAM(s) Nebulizer every 6 hours  aspirin enteric coated 81 milliGRAM(s) Oral daily  benzonatate 100 milliGRAM(s) Oral every 8 hours  brimonidine 0.2% Ophthalmic Solution 1 Drop(s) Left EYE two times a day  enoxaparin Injectable 40 milliGRAM(s) SubCutaneous daily  guaiFENesin   Syrup  (Sugar-Free) 100 milliGRAM(s) Oral every 6 hours PRN  latanoprost 0.005% Ophthalmic Solution 1 Drop(s) Left EYE at bedtime  oseltamivir 30 milliGRAM(s) Oral two times a day  polyethylene glycol 3350 17 Gram(s) Oral daily PRN      HEALTH ISSUES - PROBLEM Dx:  Glaucoma: Glaucoma  HTN (hypertension): HTN (hypertension)  Takotsubo cardiomyopathy: Takotsubo cardiomyopathy  Influenza: Influenza  Discharge planning issues: Discharge planning issues Patient is a 88y old  Female who presents with a chief complaint of Cough & not making sense (02 Jan 2020 21:36)      INTERVAL HPI/OVERNIGHT EVENTS: ELENA. This am, pt states she still has productive cough. States family will stop by later to see her. Very appreciative of care provided here.     REVIEW OF SYSTEMS:    CONSTITUTIONAL: No weakness, fevers or chills  EYES/ENT: No visual changes;  No vertigo or throat pain   NECK: No pain or stiffness  RESPIRATORY: + productive cough, + wheezing, no hemoptysis; No shortness of breath  CARDIOVASCULAR: No chest pain or palpitations  GASTROINTESTINAL: No abdominal or epigastric pain. No nausea, vomiting, or hematemesis; No diarrhea or constipation. No melena or hematochezia.  GENITOURINARY: No dysuria, frequency or hematuria  NEUROLOGICAL: No numbness or weakness  All other review of systems is negative unless indicated above.    FAMILY HISTORY:  FHx: lung cancer    T(C): 36.7 (01-03-20 @ 04:26), Max: 39.4 (01-02-20 @ 18:00)  HR: 68 (01-03-20 @ 05:36) (61 - 82)  BP: 125/64 (01-03-20 @ 04:26) (109/52 - 168/87)  RR: 18 (01-03-20 @ 04:26) (18 - 20)  SpO2: 94% (01-03-20 @ 05:36) (93% - 96%)  Wt(kg): --Vital Signs Last 24 Hrs  T(C): 36.7 (03 Jan 2020 04:26), Max: 39.4 (02 Jan 2020 18:00)  T(F): 98.1 (03 Jan 2020 04:26), Max: 102.9 (02 Jan 2020 18:00)  HR: 68 (03 Jan 2020 05:36) (61 - 82)  BP: 125/64 (03 Jan 2020 04:26) (109/52 - 168/87)  BP(mean): --  RR: 18 (03 Jan 2020 04:26) (18 - 20)  SpO2: 94% (03 Jan 2020 05:36) (93% - 96%)    PHYSICAL EXAM:  	Gen: female in NAD, appears comfortable, no diaphoresis  	HEENT: NCAT, MMM, neck soft and supple  	CV: +S1/S2, no m/r/g  	Resp: Rhonchi noted b/l. No wheezing. Good air movement. No clear egophony or crackles.   	GI: normoactive BS, soft, NTND, no rebounding/guarding  	Ext: No LE edema, extremities appear warm and well perfused   	Neuro: AOx3, no focal deficits, CNII-XII grossly intact  	Psych: No SI/HI/AVH, appropriate affect  Skin: no petechiae, ecchymosis or maculopapular rash noted    Consultant(s) Notes Reviewed:  [x ] YES  [ ] NO  Care Discussed with Consultants/Other Providers [ x] YES  [ ] NO    LABS:                        10.8   9.23  )-----------( 160      ( 03 Jan 2020 06:34 )             33.5     03 Jan 2020 06:34    134    |  100    |  14     ----------------------------<  99     4.0     |  25     |  0.77     Ca    9.4        03 Jan 2020 06:34    TPro  6.9    /  Alb  3.3    /  TBili  0.3    /  DBili  x      /  AST  43     /  ALT  37     /  AlkPhos  75     03 Jan 2020 06:34    PT/INR - ( 02 Jan 2020 18:25 )   PT: 11.7 sec;   INR: 1.02 ratio         PTT - ( 02 Jan 2020 18:25 )  PTT:30.5 sec  CAPILLARY BLOOD GLUCOSE        BLOOD CULTURE      RADIOLOGY & ADDITIONAL TESTS:    Imaging Personally Reviewed:  [ ] YES  [ ] NO  acetaminophen   Tablet .. 650 milliGRAM(s) Oral every 6 hours PRN  ALBUTerol   0.042% 1.25 milliGRAM(s) Nebulizer every 6 hours  aspirin enteric coated 81 milliGRAM(s) Oral daily  benzonatate 100 milliGRAM(s) Oral every 8 hours  brimonidine 0.2% Ophthalmic Solution 1 Drop(s) Left EYE two times a day  enoxaparin Injectable 40 milliGRAM(s) SubCutaneous daily  guaiFENesin   Syrup  (Sugar-Free) 100 milliGRAM(s) Oral every 6 hours PRN  latanoprost 0.005% Ophthalmic Solution 1 Drop(s) Left EYE at bedtime  oseltamivir 30 milliGRAM(s) Oral two times a day  polyethylene glycol 3350 17 Gram(s) Oral daily PRN      HEALTH ISSUES - PROBLEM Dx:  Glaucoma: Glaucoma  HTN (hypertension): HTN (hypertension)  Takotsubo cardiomyopathy: Takotsubo cardiomyopathy  Influenza: Influenza  Discharge planning issues: Discharge planning issues

## 2020-01-03 NOTE — DISCHARGE NOTE PROVIDER - CARE PROVIDER_API CALL
Matheus Bah  [address] [place] [id number]  Phone: [phone]  Fax: (   )    -  Follow Up Time:

## 2020-01-03 NOTE — DISCHARGE NOTE PROVIDER - HOSPITAL COURSE
88F with PMHx of Takotsubo Cardiomyopathy (recovered EF), HTN, Glaucoma, and Breast Cancer (post-left sided mastectomy decades prior) brought in by family for cough and increasing confusion for several days. Patient recently admitted at [facility] and treated for left arm cellulitis in the past week and was recently discharged three days prior to admission. Patient upon discharge began to experience non-productive cough. At time of discharge she was given Keflex to complete course and Tamiflu. The son was at bedside and unable to offer reason for Tamiflu. They are unsure if she tested positive for influenza there or if it was given as post-exposure prophylaxis. Patient took two days of the TamiFlu. While at home patient began to experience fever and productive cough (she states it's mucous-like). In addition, family states that patient was making less sense; thus, they decided to bring patient in today. While in the ED patient given Cefepime, Vancomycin, Tylenol and DuoNeb. Patient tested positive for Influenza and started on Tamiflu. When seen by me patient denies shortness of breath, but is endorsing a cough. She denies myalgias, nasal congestion, sore throat, or lower extremity edema. Patient was on 2 L NC and saturating at 98%. Nasal cannula removed by me and patient maintained saturations in the low 90s. Patient admitted for influenza.         Imaging Done:        CXR: Left midlung linear atelectasis. No focal consolidations.        Hospital Course:        Pt diagnosed with influenza and started on Tamiflu. Patient was treated symptomatically with robitussin and benzonatate. Due to negative CXR, pt was observed off abx. 88F with PMHx of Takotsubo Cardiomyopathy (recovered EF), HTN, Glaucoma, and Breast Cancer (post-left sided mastectomy decades prior) brought in by family for cough and increasing confusion for several days. Patient recently admitted at [facility] and treated for left arm cellulitis in the past week and was recently discharged three days prior to admission. Patient upon discharge began to experience non-productive cough. At time of discharge she was given Keflex to complete course and Tamiflu. The son was at bedside and unable to offer reason for Tamiflu. They are unsure if she tested positive for influenza there or if it was given as post-exposure prophylaxis. Patient took two days of the TamiFlu. While at home patient began to experience fever and productive cough (she states it's mucous-like). In addition, family states that patient was making less sense; thus, they decided to bring patient in today. While in the ED patient given Cefepime, Vancomycin, Tylenol and DuoNeb. Patient tested positive for Influenza and started on Tamiflu. When seen by me patient denies shortness of breath, but is endorsing a cough. She denies myalgias, nasal congestion, sore throat, or lower extremity edema. Patient was on 2 L NC and saturating at 98%. Nasal cannula removed by me and patient maintained saturations in the low 90s. Patient admitted for influenza.         Imaging Done:        CXR: Left midlung linear atelectasis. No focal consolidations.        Hospital Course:        Pt diagnosed with influenza and started on Tamiflu. Patient was treated symptomatically with robitussin and benzonatate. Mental status also at baseline. Due to negative CXR, pt was observed off abx. Patient condition overall improved, and medically stable for discharge to home with daughter to take care of patient. 88F with PMHx of Takotsubo Cardiomyopathy (recovered EF), HTN, Glaucoma, and Breast Cancer (post-left sided mastectomy decades prior) brought in by family for cough and increasing confusion for several days. Patient recently admitted at [facility] and treated for left arm cellulitis in the past week and was recently discharged three days prior to admission. Patient upon discharge began to experience non-productive cough. At time of discharge she was given Keflex to complete course and Tamiflu. The son was at bedside and unable to offer reason for Tamiflu. They are unsure if she tested positive for influenza there or if it was given as post-exposure prophylaxis. Patient took two days of the TamiFlu. While at home patient began to experience fever and productive cough (she states it's mucous-like). In addition, family states that patient was making less sense; thus, they decided to bring patient in today. While in the ED patient given Cefepime, Vancomycin, Tylenol and DuoNeb. Patient tested positive for Influenza and started on Tamiflu. When seen by me patient denies shortness of breath, but is endorsing a cough. She denies myalgias, nasal congestion, sore throat, or lower extremity edema. Patient was on 2 L NC and saturating at 98%. Nasal cannula removed by me and patient maintained saturations in the low 90s. Patient admitted for influenza.         Imaging Done:        CXR: Left midlung linear atelectasis. No focal consolidations.        Hospital Course:        Pt diagnosed with influenza and started on Tamiflu. Patient was treated symptomatically with robitussin and benzonatate. Mental status also at baseline. Due to negative CXR, pt was observed off abx. Patient condition overall improved, and medically optimized for discharge to home with daughter to take care of patient.             PROBLEM LIST and ATTENDING ATTESTATION         Problem/Plan - 1:    ·  Problem: Influenza.  Plan: Patient treat with a prolonged course of Tamiflu for at least seven days (patient had taken two days at home) with medication adjusted for creatinine clearance    - currently saturating well on RA and breathing comfortably    - continue with tamiflu to be completed on 1/8    - c/w Robitussin PRN cough, Benzonatate, Albuterol q6 hours standing    - Hold antibiotic given CXR clear and minimally elevated procalcitonin     - Tylenol PRN fever    - sputum cx growing rare multiple organisms likely oral nicole.     - PA/LAT CXR without consolidation. Reviewed with radiology    - Patient symptoms improved OFF abx.         Problem/Plan - 2:    ·  Problem: Leukopenia.  Plan: Patient with mild leukopenia without neutropenia.     - likely in setting of influenza.     - monitor on discharge. Repeat cbc outpatient.         Problem/Plan - 3:    ·  Problem: Takotsubo cardiomyopathy.  Plan: Recovered EF, would be conservative with giving fluids, but should not preclude treating for hypotension and insensible losses    -TTE 03/2019 EF 60%    -outpatient follow up.         Problem/Plan - 4:    ·  Problem: HTN (hypertension).  Plan: BPs overall normotensive    - Hold home Losartan given acute illness and relative hypotension    - Can resume outpatient however.         Problem/Plan - 5:    ·  Problem: Glaucoma.  Plan: - Ocular drops reviewed, will provide formulary of Brimonidine and Latanoprost.         Attending Attestation:     Patient seen and examined today. I agree with the above findings, assessment, and plan with the following additions and exceptions:         Continue Tamiflu for complete course.     PA/LAT CXR with atelectasis. No focal consolidation identified. Patient clinically improving. Continue to monitor off abx. Sputum culture likely oral nicole as patient clinically improving off abx. Can bring incentive spirometer home. Continue ambulating with assistance at hoem.     Patient awake and alert. Oriented x3. No clear focal deficits on comprehensive neurology exam.     500 cc NS given for hypovolemic hyponatremia. improved.      Patient to follow up with her PCP this Friday for repeat labs. Patient to be sent home with tamiflu, cough supressants, and prn albuterol inhaler.     The patient verbalized understanding of the above.     The patient is medically optimized for discharge with close outpatient follow up.         Discharge time spent:  38 minutes

## 2020-01-03 NOTE — PROGRESS NOTE ADULT - PROBLEM SELECTOR PLAN 2
-Recovered EF, would be conservative with giving fluids, but should not preclude treating for hypotension and insensible losses

## 2020-01-03 NOTE — DISCHARGE NOTE PROVIDER - NSDCCPCAREPLAN_GEN_ALL_CORE_FT
PRINCIPAL DISCHARGE DIAGNOSIS  Diagnosis: Influenza A  Assessment and Plan of Treatment: The flu is an infection that can cause fever, cough, body aches, and other symptoms. The most common type of flu is the "seasonal" flu. There are different forms of seasonal flu, for example, "type A" and "type B." All forms of the flu are caused by viruses. The medical term for the flu is "influenza." All forms of the flu can cause: Fever (temperature higher than 100ºF or 37.8ºC); Extreme tiredness; Headache or body aches; Cough; Sore throat; Runny nose; Flu symptoms can come on very suddenly. If you think you have the flu, stay home, rest, and drink plenty of fluids. You can also take acetaminophen (sample brand name: Tylenol) to relieve fever and aches. Do not give aspirin or medicines that contain aspirin to children younger than 18. In children, aspirin can cause a serious problem called Reye syndrome. Most people with the flu get better on their own within 1 to 2 weeks. But you should call your doctor or nurse if you: Have trouble breathing or are short of breath; Feel pain or pressure in your chest or belly; Get suddenly dizzy; Feel confused; Have severe vomiting. people with the flu can get medicines called antiviral medicines. These medicines can help people avoid some of the problems caused by the flu. Not every person with the flu needs an antiviral medicine, but some people do. Your doctor or nurse will decide if you need an antiviral medicine. Antibiotics do not work on the flu. PRINCIPAL DISCHARGE DIAGNOSIS  Diagnosis: Influenza A  Assessment and Plan of Treatment: The flu is an infection that can cause fever, cough, body aches, and other symptoms. The most common type of flu is the "seasonal" flu. There are different forms of seasonal flu, for example, "type A" and "type B." All forms of the flu are caused by viruses. The medical term for the flu is "influenza." All forms of the flu can cause: Fever (temperature higher than 100ºF or 37.8ºC); Extreme tiredness; Headache or body aches; Cough; Sore throat; Runny nose; Flu symptoms can come on very suddenly. If you think you have the flu, stay home, rest, and drink plenty of fluids. You can also take acetaminophen (sample brand name: Tylenol) to relieve fever and aches. Do not give aspirin or medicines that contain aspirin to children younger than 18. In children, aspirin can cause a serious problem called Reye syndrome. Most people with the flu get better on their own within 1 to 2 weeks. But you should call your doctor or nurse if you: Have trouble breathing or are short of breath; Feel pain or pressure in your chest or belly; Get suddenly dizzy; Feel confused; Have severe vomiting. people with the flu can get medicines called antiviral medicines. These medicines can help people avoid some of the problems caused by the flu. Not every person with the flu needs an antiviral medicine, but some people do. Your doctor or nurse will decide if you need an antiviral medicine. Antibiotics do not work on the flu.        SECONDARY DISCHARGE DIAGNOSES  Diagnosis: Hypertension  Assessment and Plan of Treatment: At home you were on losartan 25 mg. You came in with infection and we held your losartan. Your blood pressures remained normal while off of it. Please see your primary care doctor prior to resuming your losartan.

## 2020-01-03 NOTE — PROGRESS NOTE ADULT - PROBLEM SELECTOR PLAN 1
-Treat with a prolonged course of Tamiflu for at least seven days (patient had taken two days at home) with medication adjusted for creatinine clearance  -Symptomatic treatment: Robitussin PRN cough, standing Benzonatate (patient may refuse) and Albuterol q6 hours standing (patient may refuse)  -Hold antibiotics for now, but send procalcitonin level in AM  -If patient continues to worsen respiratory wise, would consider CT Chest and treating for HAP (possible developing consolidation not seen on CXR)  -Nasal cannula PRN to maintain saturation if <88%  -Tylenol PRN fever -Treat with a prolonged course of Tamiflu for at least seven days (patient had taken two days at home) with medication adjusted for creatinine clearance  -Symptomatic treatment: Robitussin PRN cough, standing Benzonatate (patient may refuse) and Albuterol q6 hours standing (patient may refuse)  -Hold antibiotics for now as no clear consolidation on CXR. Prolactin mildly elevated. Will get PA/Lat CXR to evaluate for consolidation before final decision regarding abx. Of note, patient does report improvement after ED intervention which included vanco Iv and cefepime.   -Nasal cannula PRN to maintain saturation if <88%  -Tylenol PRN fever

## 2020-01-03 NOTE — DISCHARGE NOTE PROVIDER - PROVIDER TOKENS
FREE:[LAST:[Niurka],FIRST:[Matheus],PHONE:[phone]],FAX:[(   [phone]    -],ADDRESS:[[address] [place] [address]]]

## 2020-01-04 LAB
ALBUMIN SERPL ELPH-MCNC: 3 G/DL — LOW (ref 3.3–5)
ALP SERPL-CCNC: 68 U/L — SIGNIFICANT CHANGE UP (ref 40–120)
ALT FLD-CCNC: 32 U/L — SIGNIFICANT CHANGE UP (ref 10–45)
ANION GAP SERPL CALC-SCNC: 8 MMOL/L — SIGNIFICANT CHANGE UP (ref 5–17)
AST SERPL-CCNC: 41 U/L — HIGH (ref 10–40)
BILIRUB SERPL-MCNC: 0.3 MG/DL — SIGNIFICANT CHANGE UP (ref 0.2–1.2)
BUN SERPL-MCNC: 17 MG/DL — SIGNIFICANT CHANGE UP (ref 7–23)
CALCIUM SERPL-MCNC: 9.2 MG/DL — SIGNIFICANT CHANGE UP (ref 8.4–10.5)
CHLORIDE SERPL-SCNC: 96 MMOL/L — SIGNIFICANT CHANGE UP (ref 96–108)
CO2 SERPL-SCNC: 25 MMOL/L — SIGNIFICANT CHANGE UP (ref 22–31)
CREAT SERPL-MCNC: 0.79 MG/DL — SIGNIFICANT CHANGE UP (ref 0.5–1.3)
GLUCOSE SERPL-MCNC: 86 MG/DL — SIGNIFICANT CHANGE UP (ref 70–99)
HCT VFR BLD CALC: 30 % — LOW (ref 34.5–45)
HGB BLD-MCNC: 9.8 G/DL — LOW (ref 11.5–15.5)
MAGNESIUM SERPL-MCNC: 1.9 MG/DL — SIGNIFICANT CHANGE UP (ref 1.6–2.6)
MCHC RBC-ENTMCNC: 29.3 PG — SIGNIFICANT CHANGE UP (ref 27–34)
MCHC RBC-ENTMCNC: 32.7 GM/DL — SIGNIFICANT CHANGE UP (ref 32–36)
MCV RBC AUTO: 89.8 FL — SIGNIFICANT CHANGE UP (ref 80–100)
NRBC # BLD: 0 /100 WBCS — SIGNIFICANT CHANGE UP (ref 0–0)
PHOSPHATE SERPL-MCNC: 3.1 MG/DL — SIGNIFICANT CHANGE UP (ref 2.5–4.5)
PLATELET # BLD AUTO: 152 K/UL — SIGNIFICANT CHANGE UP (ref 150–400)
POTASSIUM SERPL-MCNC: 3.8 MMOL/L — SIGNIFICANT CHANGE UP (ref 3.5–5.3)
POTASSIUM SERPL-SCNC: 3.8 MMOL/L — SIGNIFICANT CHANGE UP (ref 3.5–5.3)
PROT SERPL-MCNC: 6.4 G/DL — SIGNIFICANT CHANGE UP (ref 6–8.3)
RBC # BLD: 3.34 M/UL — LOW (ref 3.8–5.2)
RBC # FLD: 13 % — SIGNIFICANT CHANGE UP (ref 10.3–14.5)
SODIUM SERPL-SCNC: 129 MMOL/L — LOW (ref 135–145)
WBC # BLD: 5.67 K/UL — SIGNIFICANT CHANGE UP (ref 3.8–10.5)
WBC # FLD AUTO: 5.67 K/UL — SIGNIFICANT CHANGE UP (ref 3.8–10.5)

## 2020-01-04 PROCEDURE — 99233 SBSQ HOSP IP/OBS HIGH 50: CPT | Mod: GC

## 2020-01-04 RX ORDER — SODIUM CHLORIDE 9 MG/ML
250 INJECTION INTRAMUSCULAR; INTRAVENOUS; SUBCUTANEOUS ONCE
Refills: 0 | Status: COMPLETED | OUTPATIENT
Start: 2020-01-04 | End: 2020-01-04

## 2020-01-04 RX ADMIN — Medication 100 MILLIGRAM(S): at 13:02

## 2020-01-04 RX ADMIN — Medication 30 MILLIGRAM(S): at 05:24

## 2020-01-04 RX ADMIN — Medication 100 MILLIGRAM(S): at 22:09

## 2020-01-04 RX ADMIN — BRIMONIDINE TARTRATE 1 DROP(S): 2 SOLUTION/ DROPS OPHTHALMIC at 17:08

## 2020-01-04 RX ADMIN — Medication 100 MILLIGRAM(S): at 05:24

## 2020-01-04 RX ADMIN — SODIUM CHLORIDE 83.33 MILLILITER(S): 9 INJECTION INTRAMUSCULAR; INTRAVENOUS; SUBCUTANEOUS at 14:32

## 2020-01-04 RX ADMIN — SODIUM CHLORIDE 83.33 MILLILITER(S): 9 INJECTION INTRAMUSCULAR; INTRAVENOUS; SUBCUTANEOUS at 10:38

## 2020-01-04 RX ADMIN — ALBUTEROL 1.25 MILLIGRAM(S): 90 AEROSOL, METERED ORAL at 05:46

## 2020-01-04 RX ADMIN — BRIMONIDINE TARTRATE 1 DROP(S): 2 SOLUTION/ DROPS OPHTHALMIC at 05:24

## 2020-01-04 RX ADMIN — ALBUTEROL 1.25 MILLIGRAM(S): 90 AEROSOL, METERED ORAL at 23:00

## 2020-01-04 RX ADMIN — ALBUTEROL 1.25 MILLIGRAM(S): 90 AEROSOL, METERED ORAL at 11:42

## 2020-01-04 RX ADMIN — Medication 81 MILLIGRAM(S): at 13:02

## 2020-01-04 RX ADMIN — Medication 30 MILLIGRAM(S): at 17:08

## 2020-01-04 RX ADMIN — LATANOPROST 1 DROP(S): 0.05 SOLUTION/ DROPS OPHTHALMIC; TOPICAL at 22:09

## 2020-01-04 RX ADMIN — ENOXAPARIN SODIUM 40 MILLIGRAM(S): 100 INJECTION SUBCUTANEOUS at 13:02

## 2020-01-04 RX ADMIN — ALBUTEROL 1.25 MILLIGRAM(S): 90 AEROSOL, METERED ORAL at 17:42

## 2020-01-04 NOTE — PROGRESS NOTE ADULT - PROBLEM SELECTOR PLAN 2
-Recovered EF, would be conservative with giving fluids, but should not preclude treating for hypotension and insensible losses  -TTE 03/2019 EF 60%

## 2020-01-04 NOTE — PROGRESS NOTE ADULT - ASSESSMENT
88F with PMHx of Takotsubo Cardiomyopathy (recovered EF), HTN, Glaucoma, and Breast Cancer (post-left sided mastectomy decades prior) brought in by family for cough and increasing confusion for several days. Patient AOx3 upon evaluation. She was recently admitted at [facility] for cellulitis of left arm (now resolved, discharged two days prior to admission) and given Tamiflu for cough which she developed upon discharge. Patient took it for two days, but is presenting today because of fever and continued cough and reported confusion per family. Unsure if patient had tested positive for influenza at [facility] or if TamiFlu given as post-exposure prophylaxis. Patient tested positive for influenza with CXR showing no clear consolidation.

## 2020-01-04 NOTE — PROGRESS NOTE ADULT - PROBLEM SELECTOR PLAN 1
-Treat with a prolonged course of Tamiflu for at least seven days (patient had taken two days at home) with medication adjusted for creatinine clearance. To end 1/8  -Symptomatic treatment: Robitussin PRN cough, Benzonatate, Albuterol q6 hours standing  -Hold antibiotics for now as no clear consolidation on CXR. Prolactin mildly elevated.   -PA/Lat CXR pending to evaluate for consolidation before final decision regarding abx. Of note, patient does report improvement after ED intervention which included vanco Iv and cefepime.   -Nasal cannula PRN to maintain saturation if <88%  -Tylenol PRN fever -Treat with a prolonged course of Tamiflu for at least seven days (patient had taken two days at home) with medication adjusted for creatinine clearance. To end 1/8  -Symptomatic treatment: Robitussin PRN cough, Benzonatate, Albuterol q6 hours standing  -Hold antibiotics for now as no clear consolidation on CXR. Prolactin mildly elevated.   -PA/Lat CXR clear, no focal consolidation. Of note, patient does report improvement after ED intervention which included vanco Iv and cefepime.   -Nasal cannula PRN to maintain saturation if <88%  -Tylenol PRN fever  -sputum cx:  Numerous polymorphonuclear leukocytes per low power field  Rare Squamous epithelial cells per low power field  Few Yeast like cells per oil power field  Few Gram positive cocci in pairs per oil power field  Rare Gram Negative Rods per oil power field  Rare Gram Negative Diplococci per oil power field

## 2020-01-04 NOTE — PROGRESS NOTE ADULT - PROBLEM SELECTOR PLAN 5
Transitions of Care Status:  1.  Name of PCP: [facility]  2.  PCP Contacted on Admission: [ ] Y    [x ] N    3.  PCP contacted at Discharge: [ ] Y    [ ] N    [ ] N/A  4.  Post-Discharge Appointment Date and Location:  5.  Summary of Handoff given to PCP:

## 2020-01-05 ENCOUNTER — TRANSCRIPTION ENCOUNTER (OUTPATIENT)
Age: 85
End: 2020-01-05

## 2020-01-05 VITALS
SYSTOLIC BLOOD PRESSURE: 164 MMHG | TEMPERATURE: 98 F | RESPIRATION RATE: 18 BRPM | DIASTOLIC BLOOD PRESSURE: 69 MMHG | OXYGEN SATURATION: 99 % | HEART RATE: 73 BPM

## 2020-01-05 DIAGNOSIS — D72.819 DECREASED WHITE BLOOD CELL COUNT, UNSPECIFIED: ICD-10-CM

## 2020-01-05 LAB
ANION GAP SERPL CALC-SCNC: 11 MMOL/L — SIGNIFICANT CHANGE UP (ref 5–17)
BASOPHILS # BLD AUTO: 0 K/UL — SIGNIFICANT CHANGE UP (ref 0–0.2)
BASOPHILS NFR BLD AUTO: 0 % — SIGNIFICANT CHANGE UP (ref 0–2)
BUN SERPL-MCNC: 18 MG/DL — SIGNIFICANT CHANGE UP (ref 7–23)
CALCIUM SERPL-MCNC: 9.1 MG/DL — SIGNIFICANT CHANGE UP (ref 8.4–10.5)
CHLORIDE SERPL-SCNC: 98 MMOL/L — SIGNIFICANT CHANGE UP (ref 96–108)
CO2 SERPL-SCNC: 24 MMOL/L — SIGNIFICANT CHANGE UP (ref 22–31)
CREAT SERPL-MCNC: 0.81 MG/DL — SIGNIFICANT CHANGE UP (ref 0.5–1.3)
CULTURE RESULTS: SIGNIFICANT CHANGE UP
EOSINOPHIL # BLD AUTO: 0.06 K/UL — SIGNIFICANT CHANGE UP (ref 0–0.5)
EOSINOPHIL NFR BLD AUTO: 1.7 % — SIGNIFICANT CHANGE UP (ref 0–6)
GIANT PLATELETS BLD QL SMEAR: PRESENT — SIGNIFICANT CHANGE UP
GLUCOSE SERPL-MCNC: 93 MG/DL — SIGNIFICANT CHANGE UP (ref 70–99)
HCT VFR BLD CALC: 32.4 % — LOW (ref 34.5–45)
HGB BLD-MCNC: 10.5 G/DL — LOW (ref 11.5–15.5)
LYMPHOCYTES # BLD AUTO: 0.94 K/UL — LOW (ref 1–3.3)
LYMPHOCYTES # BLD AUTO: 25.9 % — SIGNIFICANT CHANGE UP (ref 13–44)
MAGNESIUM SERPL-MCNC: 2 MG/DL — SIGNIFICANT CHANGE UP (ref 1.6–2.6)
MANUAL SMEAR VERIFICATION: SIGNIFICANT CHANGE UP
MCHC RBC-ENTMCNC: 29.2 PG — SIGNIFICANT CHANGE UP (ref 27–34)
MCHC RBC-ENTMCNC: 32.4 GM/DL — SIGNIFICANT CHANGE UP (ref 32–36)
MCV RBC AUTO: 90.3 FL — SIGNIFICANT CHANGE UP (ref 80–100)
MONOCYTES # BLD AUTO: 0.19 K/UL — SIGNIFICANT CHANGE UP (ref 0–0.9)
MONOCYTES NFR BLD AUTO: 5.2 % — SIGNIFICANT CHANGE UP (ref 2–14)
NEUTROPHILS # BLD AUTO: 2.41 K/UL — SIGNIFICANT CHANGE UP (ref 1.8–7.4)
NEUTROPHILS NFR BLD AUTO: 66.4 % — SIGNIFICANT CHANGE UP (ref 43–77)
NRBC # BLD: 0 /100 WBCS — SIGNIFICANT CHANGE UP (ref 0–0)
PHOSPHATE SERPL-MCNC: 3.2 MG/DL — SIGNIFICANT CHANGE UP (ref 2.5–4.5)
PLAT MORPH BLD: NORMAL — SIGNIFICANT CHANGE UP
PLATELET # BLD AUTO: 194 K/UL — SIGNIFICANT CHANGE UP (ref 150–400)
POTASSIUM SERPL-MCNC: 3.7 MMOL/L — SIGNIFICANT CHANGE UP (ref 3.5–5.3)
POTASSIUM SERPL-SCNC: 3.7 MMOL/L — SIGNIFICANT CHANGE UP (ref 3.5–5.3)
RBC # BLD: 3.59 M/UL — LOW (ref 3.8–5.2)
RBC # FLD: 12.8 % — SIGNIFICANT CHANGE UP (ref 10.3–14.5)
RBC BLD AUTO: SIGNIFICANT CHANGE UP
SMUDGE CELLS # BLD: PRESENT — SIGNIFICANT CHANGE UP
SODIUM SERPL-SCNC: 133 MMOL/L — LOW (ref 135–145)
SPECIMEN SOURCE: SIGNIFICANT CHANGE UP
VARIANT LYMPHS # BLD: 0.8 % — SIGNIFICANT CHANGE UP (ref 0–6)
WBC # BLD: 3.63 K/UL — LOW (ref 3.8–10.5)
WBC # FLD AUTO: 3.63 K/UL — LOW (ref 3.8–10.5)

## 2020-01-05 PROCEDURE — 87070 CULTURE OTHR SPECIMN AEROBIC: CPT

## 2020-01-05 PROCEDURE — 83605 ASSAY OF LACTIC ACID: CPT

## 2020-01-05 PROCEDURE — 99232 SBSQ HOSP IP/OBS MODERATE 35: CPT | Mod: GC

## 2020-01-05 PROCEDURE — 83735 ASSAY OF MAGNESIUM: CPT

## 2020-01-05 PROCEDURE — 85610 PROTHROMBIN TIME: CPT

## 2020-01-05 PROCEDURE — 71045 X-RAY EXAM CHEST 1 VIEW: CPT

## 2020-01-05 PROCEDURE — 87086 URINE CULTURE/COLONY COUNT: CPT

## 2020-01-05 PROCEDURE — 81001 URINALYSIS AUTO W/SCOPE: CPT

## 2020-01-05 PROCEDURE — 71046 X-RAY EXAM CHEST 2 VIEWS: CPT

## 2020-01-05 PROCEDURE — 87581 M.PNEUMON DNA AMP PROBE: CPT

## 2020-01-05 PROCEDURE — 80053 COMPREHEN METABOLIC PANEL: CPT

## 2020-01-05 PROCEDURE — 99285 EMERGENCY DEPT VISIT HI MDM: CPT | Mod: 25

## 2020-01-05 PROCEDURE — 85730 THROMBOPLASTIN TIME PARTIAL: CPT

## 2020-01-05 PROCEDURE — 87486 CHLMYD PNEUM DNA AMP PROBE: CPT

## 2020-01-05 PROCEDURE — 87040 BLOOD CULTURE FOR BACTERIA: CPT

## 2020-01-05 PROCEDURE — 85027 COMPLETE CBC AUTOMATED: CPT

## 2020-01-05 PROCEDURE — 80048 BASIC METABOLIC PNL TOTAL CA: CPT

## 2020-01-05 PROCEDURE — 84145 PROCALCITONIN (PCT): CPT

## 2020-01-05 PROCEDURE — 94640 AIRWAY INHALATION TREATMENT: CPT

## 2020-01-05 PROCEDURE — 84100 ASSAY OF PHOSPHORUS: CPT

## 2020-01-05 PROCEDURE — 93005 ELECTROCARDIOGRAM TRACING: CPT

## 2020-01-05 PROCEDURE — 87798 DETECT AGENT NOS DNA AMP: CPT

## 2020-01-05 PROCEDURE — 87633 RESP VIRUS 12-25 TARGETS: CPT

## 2020-01-05 PROCEDURE — 96374 THER/PROPH/DIAG INJ IV PUSH: CPT

## 2020-01-05 RX ORDER — ACETAMINOPHEN 500 MG
1 TABLET ORAL
Qty: 56 | Refills: 0
Start: 2020-01-05 | End: 2020-01-18

## 2020-01-05 RX ORDER — LOSARTAN POTASSIUM 100 MG/1
1 TABLET, FILM COATED ORAL
Qty: 0 | Refills: 0 | DISCHARGE

## 2020-01-05 RX ORDER — ALBUTEROL 90 UG/1
2 AEROSOL, METERED ORAL
Qty: 1 | Refills: 0
Start: 2020-01-05

## 2020-01-05 RX ADMIN — ALBUTEROL 1.25 MILLIGRAM(S): 90 AEROSOL, METERED ORAL at 05:06

## 2020-01-05 RX ADMIN — ENOXAPARIN SODIUM 40 MILLIGRAM(S): 100 INJECTION SUBCUTANEOUS at 11:14

## 2020-01-05 RX ADMIN — Medication 30 MILLIGRAM(S): at 05:23

## 2020-01-05 RX ADMIN — Medication 100 MILLIGRAM(S): at 13:25

## 2020-01-05 RX ADMIN — Medication 81 MILLIGRAM(S): at 13:25

## 2020-01-05 RX ADMIN — Medication 100 MILLIGRAM(S): at 05:23

## 2020-01-05 RX ADMIN — BRIMONIDINE TARTRATE 1 DROP(S): 2 SOLUTION/ DROPS OPHTHALMIC at 05:23

## 2020-01-05 NOTE — PROGRESS NOTE ADULT - PROBLEM SELECTOR PLAN 3
Recovered EF, would be conservative with giving fluids, but should not preclude treating for hypotension and insensible losses  -TTE 03/2019 EF 60% Recovered EF, would be conservative with giving fluids, but should not preclude treating for hypotension and insensible losses  -TTE 03/2019 EF 60%  -outpatient follow up.

## 2020-01-05 NOTE — PROGRESS NOTE ADULT - PROBLEM SELECTOR PLAN 4
BPs overall normotensive  - Hold home Losartan given acute illness and relative hypotension BPs overall normotensive  - Hold home Losartan given acute illness and relative hypotension  - Can resume outpatient however.

## 2020-01-05 NOTE — PROGRESS NOTE ADULT - ASSESSMENT
88F with PMHx of Takotsubo Cardiomyopathy (recovered EF), HTN, Glaucoma, and Breast Cancer (post-left sided mastectomy decades prior), and recent admission for cellulitis of LUE and started on tamiflu (unclear if prophlyaxis vs acute infection) brought in by family for cough and increasing confusion for several days, found to have influenza

## 2020-01-05 NOTE — PROGRESS NOTE ADULT - PROBLEM SELECTOR PLAN 2
Patient with mild leukopenia without neutropenia.   - likely in setting of influenza  - monitor on discharge Patient with mild leukopenia without neutropenia.   - likely in setting of influenza.   - monitor on discharge. Repeat cbc outpatient

## 2020-01-05 NOTE — PROGRESS NOTE ADULT - PROBLEM SELECTOR PLAN 6
Transitions of Care Status:  1.  Name of PCP: [facility]  2.  PCP Contacted on Admission: [ ] Y    [x ] N    3.  PCP contacted at Discharge: [ ] Y    [ ] N    [ ] N/A  4.  Post-Discharge Appointment Date and Location:  5.  Summary of Handoff given to PCP:

## 2020-01-05 NOTE — DISCHARGE NOTE NURSING/CASE MANAGEMENT/SOCIAL WORK - PATIENT PORTAL LINK FT
You can access the FollowMyHealth Patient Portal offered by [facility] by registering at the following website: [email or web address]. By joining [facility]’s FollowMyHealth portal, you will also be able to view your health information using other applications (apps) compatible with our system.

## 2020-01-05 NOTE — PROGRESS NOTE ADULT - PROBLEM SELECTOR PLAN 1
Patient treat with a prolonged course of Tamiflu for at least seven days (patient had taken two days at home) with medication adjusted for creatinine clearance  - currently saturating well on RA and breathing comfortably  - continue with tamiflu to be completed on 1/8  - c/w Robitussin PRN cough, Benzonatate, Albuterol q6 hours standing  - Hold antibiotic given CXR clear and minimally elevated procalcitonin   - Tylenol PRN fever  - sputum cx growing rare multiple organisms Patient treat with a prolonged course of Tamiflu for at least seven days (patient had taken two days at home) with medication adjusted for creatinine clearance  - currently saturating well on RA and breathing comfortably  - continue with tamiflu to be completed on 1/8  - c/w Robitussin PRN cough, Benzonatate, Albuterol q6 hours standing  - Hold antibiotic given CXR clear and minimally elevated procalcitonin   - Tylenol PRN fever  - sputum cx growing rare multiple organisms likely oral nicole.   - PA/LAT CXR without consolidation. Reviewed with radiology  - Patient symptoms improved OFF abx.

## 2020-01-05 NOTE — PROGRESS NOTE ADULT - ATTENDING COMMENTS
Patient seen and examined today. I agree with the above findings, assessment, and plan with the following additions and exceptions:     Continue Tamiflu.   PA/LAT CXR with atelectasis. No focal consolidation identified. Patient clinically improving. Continue to monitor off abx. Sputum culture likely oral nicole as patient clinically improving off abx.     Incentive spirometer  OOBTC to chair daily  Patient awake and alert. Oriented x3. No clear focal deficits on comprehensive neurology exam.   500 cc NS given for hypovolemic hyponatremia. BUN mild increase with dry oral mucosa. Encourage PO intake.   Plan for d/c tomorrow.   Rest of plan as above    Dr. Dominick Paniagua, DO  Attending Physician  Division of Hospital Medicine  [facility]  Pager:  [phone]
Patient seen and examined today. I agree with the above findings, assessment, and plan with the following additions and exceptions:     Continue Tamiflu.   CXR with atelectasis and possible blurring of right cardiac border on my interpretation. Procalcitonin elevated however mildly. Will get PA/LAT CXR to evaluate for consolidation. S/p Vanco iv and cefepime by ED. Monitor off abx for now.   Incentive spirometer  OOBTC to chair daily  Add on serum Mg and P.  Patient awake and alert. Oriented x3. No clear focal deficits on comprehensive neurology exam.   Patient to go home when medically ready likely tomorrow.   Rest of plan as above    Dr. Dominick Paniagua,   Attending Physician  Division of Hospital Medicine  [facility]  Pager:  [phone]
Patient seen and examined today. I agree with the above findings, assessment, and plan with the following additions and exceptions:     Continue Tamiflu for complete course.   PA/LAT CXR with atelectasis. No focal consolidation identified. Patient clinically improving. Continue to monitor off abx. Sputum culture likely oral nicole as patient clinically improving off abx. Can bring incentive spirometer home. Continue ambulating with assistance at hoem.   Patient awake and alert. Oriented x3. No clear focal deficits on comprehensive neurology exam.   500 cc NS given for hypovolemic hyponatremia. improved.    Patient to follow up with her PCP this Friday for repeat labs. Patient to be sent home with tamiflu, cough supressants, and prn albuterol inhaler.   The patient verbalized understanding of the above.   The patient is medically optimized for discharge with close outpatient follow up.     Discharge time spent:  38 minutes     Dr. Dominick Paniagua DO  Attending Physician  Division of Hospital Medicine  [facility]  Pager:  [phone]

## 2020-01-05 NOTE — PROGRESS NOTE ADULT - SUBJECTIVE AND OBJECTIVE BOX
Digna Blue, PGY2  NS Pager:[phone] /AMARI Pager: [phone]  After 7: Night Float pager    Patient is a 88y old  Female who presents with a chief complaint of Cough & not making sense (04 Jan 2020 07:05)      SUBJECTIVE / OVERNIGHT EVENTS:  No events overnight. Currently with cough but otherwise denying chest pain, shortness of breath, abdominal pain, nausea, vomiting, diarrhea.       MEDICATIONS  (STANDING):  ALBUTerol   0.042% 1.25 milliGRAM(s) Nebulizer every 6 hours  aspirin enteric coated 81 milliGRAM(s) Oral daily  benzonatate 100 milliGRAM(s) Oral every 8 hours  brimonidine 0.2% Ophthalmic Solution 1 Drop(s) Left EYE two times a day  enoxaparin Injectable 40 milliGRAM(s) SubCutaneous daily  latanoprost 0.005% Ophthalmic Solution 1 Drop(s) Left EYE at bedtime  oseltamivir 30 milliGRAM(s) Oral two times a day    MEDICATIONS  (PRN):  acetaminophen   Tablet .. 650 milliGRAM(s) Oral every 6 hours PRN Temp greater or equal to 38.5C (101.3F), Mild Pain (1 - 3), Moderate Pain (4 - 6)  guaiFENesin   Syrup  (Sugar-Free) 100 milliGRAM(s) Oral every 6 hours PRN Cough  polyethylene glycol 3350 17 Gram(s) Oral daily PRN Constipation      Vital Signs Last 24 Hrs  T(C): 36.7 (05 Jan 2020 05:08), Max: 36.8 (04 Jan 2020 14:15)  T(F): 98.1 (05 Jan 2020 05:08), Max: 98.2 (04 Jan 2020 14:15)  HR: 59 (05 Jan 2020 05:08) (59 - 76)  BP: 138/56 (05 Jan 2020 05:08) (132/67 - 151/71)  BP(mean): --  RR: 18 (05 Jan 2020 05:08) (17 - 18)  SpO2: 97% (05 Jan 2020 05:08) (95% - 99%)  CAPILLARY BLOOD GLUCOSE        I&O's Summary    04 Jan 2020 07:01  -  05 Jan 2020 07:00  --------------------------------------------------------  IN: 720 mL / OUT: 0 mL / NET: 720 mL        PHYSICAL EXAM:  GENERAL: NAD, well-developed  EYES: EOMI, PERRLA, conjunctiva and sclera clear  NECK:  No JVD  CHEST/LUNG: CTABL ; + murmur  HEART: RRR; No murmurs  ABDOMEN: Soft, Nontender, Nondistended; Bowel sounds present  : No Hitchcock  EXTREMITIES:  2+ Peripheral Pulses, No edema  PSYCH: AAOx3  NEUROLOGY: non-focal  SKIN: No rashes or lesions. No sacral ulcer    LABS:                        10.5   3.63  )-----------( 194      ( 05 Jan 2020 07:10 )             32.4     01-05    133<L>  |  98  |  18  ----------------------------<  93  3.7   |  24  |  0.81    Ca    9.1      05 Jan 2020 07:09  Phos  3.2     01-05  Mg     2.0     01-05    TPro  6.4  /  Alb  3.0<L>  /  TBili  0.3  /  DBili  x   /  AST  41<H>  /  ALT  32  /  AlkPhos  68  01-04              Microbiology;        RADIOLOGY & ADDITIONAL TESTS:    Imaging Personally Reviewed:          Consultant(s) Notes Reviewed:      Care Discussed with Consultants/Other Providers: Digna Ritchiesilasbill, PGY2  NS Pager:[phone] /[facility] Pager: [phone]  After 7: Night Float pager    Patient is a 88y old  Female who presents with a chief complaint of Cough & not making sense (04 Jan 2020 07:05)      SUBJECTIVE / OVERNIGHT EVENTS:  No events overnight. Currently with cough but otherwise denying chest pain, shortness of breath, abdominal pain, nausea, vomiting, diarrhea.     REVIEW OF SYSTEMS  CONSTITUTIONAL: No fevers. No chills  EYES/ENT: No visual changes. No discharge from eyes. No vertigo. No throat pain. No dysphagia.  NECK: No pain or stiffness or rigidity.  RESPIRATORY: +cough. No wheezing. No hemoptysis. No shortness of breath.  CARDIOVASCULAR: No chest pain. No palpitations.   GASTROINTESTINAL: No abdominal pain. No nausea. No vomiting. No hematemesis. No diarrhea. No constipation. No melena, No hematochezia.  GENITOURINARY: No dysuria. No hesitancy. No frequency. No hematuria.  NEUROLOGICAL: No numbness. No weakness. No change in speech. No fecal or urinary incontinence.   MSK: No joint swelling or erythema. No back pain.  SKIN: No itching or rashes.   PSYCH: Normal affect. Normal mood. No si. No hi.    Review of systems negative except for items noted above.      MEDICATIONS  (STANDING):  ALBUTerol   0.042% 1.25 milliGRAM(s) Nebulizer every 6 hours  aspirin enteric coated 81 milliGRAM(s) Oral daily  benzonatate 100 milliGRAM(s) Oral every 8 hours  brimonidine 0.2% Ophthalmic Solution 1 Drop(s) Left EYE two times a day  enoxaparin Injectable 40 milliGRAM(s) SubCutaneous daily  latanoprost 0.005% Ophthalmic Solution 1 Drop(s) Left EYE at bedtime  oseltamivir 30 milliGRAM(s) Oral two times a day    MEDICATIONS  (PRN):  acetaminophen   Tablet .. 650 milliGRAM(s) Oral every 6 hours PRN Temp greater or equal to 38.5C (101.3F), Mild Pain (1 - 3), Moderate Pain (4 - 6)  guaiFENesin   Syrup  (Sugar-Free) 100 milliGRAM(s) Oral every 6 hours PRN Cough  polyethylene glycol 3350 17 Gram(s) Oral daily PRN Constipation      Vital Signs Last 24 Hrs  T(C): 36.7 (05 Jan 2020 05:08), Max: 36.8 (04 Jan 2020 14:15)  T(F): 98.1 (05 Jan 2020 05:08), Max: 98.2 (04 Jan 2020 14:15)  HR: 59 (05 Jan 2020 05:08) (59 - 76)  BP: 138/56 (05 Jan 2020 05:08) (132/67 - 151/71)  BP(mean): --  RR: 18 (05 Jan 2020 05:08) (17 - 18)  SpO2: 97% (05 Jan 2020 05:08) (95% - 99%)  CAPILLARY BLOOD GLUCOSE        I&O's Summary    04 Jan 2020 07:01  -  05 Jan 2020 07:00  --------------------------------------------------------  IN: 720 mL / OUT: 0 mL / NET: 720 mL        PHYSICAL EXAM:  Gen: female in NAD, appears comfortable, no diaphoresis  HEENT: NCAT, dry oral mucosa, neck soft and supple  CV: +S1/S2, no m/r/g  Resp: Good air movement. Improved rhonchi b/l. No clear egophony or crackles. No wheezing.   GI: normoactive BS, soft, NTND, no rebounding/guarding  Ext: No LE edema, extremities appear warm and well perfused   Neuro: AOx3, no focal deficits, CNII-XII grossly intact  Psych: No SI/HI/AVH, appropriate affect  Skin: no petechiae, ecchymosis or maculopapular rash noted  LABS:                        10.5   3.63  )-----------( 194      ( 05 Jan 2020 07:10 )             32.4     01-05    133<L>  |  98  |  18  ----------------------------<  93  3.7   |  24  |  0.81    Ca    9.1      05 Jan 2020 07:09  Phos  3.2     01-05  Mg     2.0     01-05    TPro  6.4  /  Alb  3.0<L>  /  TBili  0.3  /  DBili  x   /  AST  41<H>  /  ALT  32  /  AlkPhos  68  01-04

## 2020-02-01 NOTE — PATIENT PROFILE ADULT - MONEY FOR FOOD
Added to chart. Patient sent [employer] message.  Tickled [employer] Patient moved to CC room 27, with airborne isolation in place. no

## 2020-02-22 ENCOUNTER — TRANSCRIPTION ENCOUNTER (OUTPATIENT)
Age: 85
End: 2020-02-22

## 2020-02-23 ENCOUNTER — INPATIENT (INPATIENT)
Facility: HOSPITAL | Age: 85
LOS: 0 days | DRG: 957 | End: 2020-02-23
Attending: SURGERY | Admitting: SURGERY
Payer: COMMERCIAL

## 2020-02-23 VITALS — WEIGHT: 119.93 LBS | RESPIRATION RATE: 19 BRPM | HEIGHT: 61 IN | HEART RATE: 84 BPM | OXYGEN SATURATION: 91 %

## 2020-02-23 VITALS
WEIGHT: 119.93 LBS | DIASTOLIC BLOOD PRESSURE: 72 MMHG | HEART RATE: 96 BPM | SYSTOLIC BLOOD PRESSURE: 128 MMHG | RESPIRATION RATE: 18 BRPM | HEIGHT: 61 IN

## 2020-02-23 DIAGNOSIS — Z90.12 ACQUIRED ABSENCE OF LEFT BREAST AND NIPPLE: Chronic | ICD-10-CM

## 2020-02-23 DIAGNOSIS — Z90.49 ACQUIRED ABSENCE OF OTHER SPECIFIED PARTS OF DIGESTIVE TRACT: Chronic | ICD-10-CM

## 2020-02-23 DIAGNOSIS — S29.9XXA UNSPECIFIED INJURY OF THORAX, INITIAL ENCOUNTER: ICD-10-CM

## 2020-02-23 LAB
ALBUMIN SERPL ELPH-MCNC: 2.3 G/DL — LOW (ref 3.3–5)
ALBUMIN SERPL ELPH-MCNC: 2.7 G/DL — LOW (ref 3.3–5)
ALP SERPL-CCNC: 49 U/L — SIGNIFICANT CHANGE UP (ref 40–120)
ALP SERPL-CCNC: 54 U/L — SIGNIFICANT CHANGE UP (ref 40–120)
ALT FLD-CCNC: 31 U/L — SIGNIFICANT CHANGE UP (ref 10–45)
ALT FLD-CCNC: 47 U/L — HIGH (ref 10–45)
ANION GAP SERPL CALC-SCNC: 11 MMOL/L — SIGNIFICANT CHANGE UP (ref 5–17)
ANION GAP SERPL CALC-SCNC: 16 MMOL/L — SIGNIFICANT CHANGE UP (ref 5–17)
ANION GAP SERPL CALC-SCNC: 20 MMOL/L — HIGH (ref 5–17)
APTT BLD: 37.6 SEC — HIGH (ref 27.5–36.3)
APTT BLD: 70.6 SEC — HIGH (ref 27.5–36.3)
APTT BLD: 84.2 SEC — HIGH (ref 27.5–36.3)
AST SERPL-CCNC: 49 U/L — HIGH (ref 10–40)
AST SERPL-CCNC: 69 U/L — HIGH (ref 10–40)
BILIRUB DIRECT SERPL-MCNC: 0.5 MG/DL — HIGH (ref 0–0.2)
BILIRUB INDIRECT FLD-MCNC: 0.7 MG/DL — SIGNIFICANT CHANGE UP (ref 0.2–1)
BILIRUB SERPL-MCNC: 0.2 MG/DL — SIGNIFICANT CHANGE UP (ref 0.2–1.2)
BILIRUB SERPL-MCNC: 1.2 MG/DL — SIGNIFICANT CHANGE UP (ref 0.2–1.2)
BLD GP AB SCN SERPL QL: NEGATIVE — SIGNIFICANT CHANGE UP
BUN SERPL-MCNC: 19 MG/DL — SIGNIFICANT CHANGE UP (ref 7–23)
BUN SERPL-MCNC: 21 MG/DL — SIGNIFICANT CHANGE UP (ref 7–23)
BUN SERPL-MCNC: 23 MG/DL — SIGNIFICANT CHANGE UP (ref 7–23)
CALCIUM SERPL-MCNC: 14.3 MG/DL — CRITICAL HIGH (ref 8.4–10.5)
CALCIUM SERPL-MCNC: 6.3 MG/DL — CRITICAL LOW (ref 8.4–10.5)
CALCIUM SERPL-MCNC: 6.5 MG/DL — CRITICAL LOW (ref 8.4–10.5)
CHLORIDE SERPL-SCNC: 105 MMOL/L — SIGNIFICANT CHANGE UP (ref 96–108)
CHLORIDE SERPL-SCNC: 106 MMOL/L — SIGNIFICANT CHANGE UP (ref 96–108)
CHLORIDE SERPL-SCNC: 106 MMOL/L — SIGNIFICANT CHANGE UP (ref 96–108)
CO2 SERPL-SCNC: 12 MMOL/L — LOW (ref 22–31)
CO2 SERPL-SCNC: 14 MMOL/L — LOW (ref 22–31)
CO2 SERPL-SCNC: 19 MMOL/L — LOW (ref 22–31)
CREAT SERPL-MCNC: 0.74 MG/DL — SIGNIFICANT CHANGE UP (ref 0.5–1.3)
CREAT SERPL-MCNC: 0.81 MG/DL — SIGNIFICANT CHANGE UP (ref 0.5–1.3)
CREAT SERPL-MCNC: 0.86 MG/DL — SIGNIFICANT CHANGE UP (ref 0.5–1.3)
ETHANOL SERPL-MCNC: SIGNIFICANT CHANGE UP MG/DL (ref 0–10)
FIBRINOGEN PPP-MCNC: 132 MG/DL — LOW (ref 350–510)
FIBRINOGEN PPP-MCNC: 312 MG/DL — LOW (ref 350–510)
GAS PNL BLDA: SIGNIFICANT CHANGE UP
GLUCOSE SERPL-MCNC: 199 MG/DL — HIGH (ref 70–99)
GLUCOSE SERPL-MCNC: 303 MG/DL — HIGH (ref 70–99)
GLUCOSE SERPL-MCNC: 330 MG/DL — HIGH (ref 70–99)
HCT VFR BLD CALC: 28.4 % — LOW (ref 34.5–45)
HCT VFR BLD CALC: 33.1 % — LOW (ref 34.5–45)
HCT VFR BLD CALC: 60.8 % — CRITICAL HIGH (ref 34.5–45)
HGB BLD-MCNC: 10.5 G/DL — LOW (ref 11.5–15.5)
HGB BLD-MCNC: 18 G/DL — HIGH (ref 11.5–15.5)
HGB BLD-MCNC: 9.2 G/DL — LOW (ref 11.5–15.5)
INR BLD: 1.12 RATIO — SIGNIFICANT CHANGE UP (ref 0.88–1.16)
INR BLD: 1.51 RATIO — HIGH (ref 0.88–1.16)
INR BLD: 1.87 RATIO — HIGH (ref 0.88–1.16)
LACTATE SERPL-SCNC: 9.1 MMOL/L — CRITICAL HIGH (ref 0.7–2)
LIDOCAIN IGE QN: 46 U/L — SIGNIFICANT CHANGE UP (ref 7–60)
MAGNESIUM SERPL-MCNC: 2 MG/DL — SIGNIFICANT CHANGE UP (ref 1.6–2.6)
MCHC RBC-ENTMCNC: 29.6 GM/DL — LOW (ref 32–36)
MCHC RBC-ENTMCNC: 29.7 PG — SIGNIFICANT CHANGE UP (ref 27–34)
MCHC RBC-ENTMCNC: 30.3 PG — SIGNIFICANT CHANGE UP (ref 27–34)
MCHC RBC-ENTMCNC: 30.6 PG — SIGNIFICANT CHANGE UP (ref 27–34)
MCHC RBC-ENTMCNC: 31.7 GM/DL — LOW (ref 32–36)
MCHC RBC-ENTMCNC: 32.4 GM/DL — SIGNIFICANT CHANGE UP (ref 32–36)
MCV RBC AUTO: 103.2 FL — HIGH (ref 80–100)
MCV RBC AUTO: 91.6 FL — SIGNIFICANT CHANGE UP (ref 80–100)
MCV RBC AUTO: 95.4 FL — SIGNIFICANT CHANGE UP (ref 80–100)
NRBC # BLD: 0 /100 WBCS — SIGNIFICANT CHANGE UP (ref 0–0)
PHOSPHATE SERPL-MCNC: 4.5 MG/DL — SIGNIFICANT CHANGE UP (ref 2.5–4.5)
PLATELET # BLD AUTO: 106 K/UL — LOW (ref 150–400)
PLATELET # BLD AUTO: 3 K/UL — CRITICAL LOW (ref 150–400)
PLATELET # BLD AUTO: 40 K/UL — LOW (ref 150–400)
POTASSIUM SERPL-MCNC: 4.1 MMOL/L — SIGNIFICANT CHANGE UP (ref 3.5–5.3)
POTASSIUM SERPL-MCNC: 7.6 MMOL/L — CRITICAL HIGH (ref 3.5–5.3)
POTASSIUM SERPL-MCNC: > 10 (ref 3.5–5.3)
POTASSIUM SERPL-SCNC: 4.1 MMOL/L — SIGNIFICANT CHANGE UP (ref 3.5–5.3)
POTASSIUM SERPL-SCNC: 7.6 MMOL/L — CRITICAL HIGH (ref 3.5–5.3)
POTASSIUM SERPL-SCNC: > 10 (ref 3.5–5.3)
PROT SERPL-MCNC: 4.7 G/DL — LOW (ref 6–8.3)
PROT SERPL-MCNC: 4.8 G/DL — LOW (ref 6–8.3)
PROTHROM AB SERPL-ACNC: 12.8 SEC — SIGNIFICANT CHANGE UP (ref 10–12.9)
PROTHROM AB SERPL-ACNC: 17.4 SEC — HIGH (ref 10–12.9)
PROTHROM AB SERPL-ACNC: 21.7 SEC — HIGH (ref 10–12.9)
RBC # BLD: 3.1 M/UL — LOW (ref 3.8–5.2)
RBC # BLD: 3.47 M/UL — LOW (ref 3.8–5.2)
RBC # BLD: 5.89 M/UL — HIGH (ref 3.8–5.2)
RBC # FLD: 12.8 % — SIGNIFICANT CHANGE UP (ref 10.3–14.5)
RBC # FLD: 13.7 % — SIGNIFICANT CHANGE UP (ref 10.3–14.5)
RBC # FLD: 13.9 % — SIGNIFICANT CHANGE UP (ref 10.3–14.5)
RH IG SCN BLD-IMP: NEGATIVE — SIGNIFICANT CHANGE UP
RH IG SCN BLD-IMP: NEGATIVE — SIGNIFICANT CHANGE UP
SODIUM SERPL-SCNC: 130 MMOL/L — LOW (ref 135–145)
SODIUM SERPL-SCNC: 134 MMOL/L — LOW (ref 135–145)
SODIUM SERPL-SCNC: 145 MMOL/L — SIGNIFICANT CHANGE UP (ref 135–145)
WBC # BLD: 2.45 K/UL — LOW (ref 3.8–10.5)
WBC # BLD: 9.72 K/UL — SIGNIFICANT CHANGE UP (ref 3.8–10.5)
WBC # BLD: <0.1 K/UL — CRITICAL LOW (ref 3.8–10.5)
WBC # FLD AUTO: 2.45 K/UL — LOW (ref 3.8–10.5)
WBC # FLD AUTO: 9.72 K/UL — SIGNIFICANT CHANGE UP (ref 3.8–10.5)
WBC # FLD AUTO: <0.1 K/UL — CRITICAL LOW (ref 3.8–10.5)

## 2020-02-23 PROCEDURE — 37244 VASC EMBOLIZE/OCCLUDE BLEED: CPT | Mod: GC

## 2020-02-23 PROCEDURE — 74018 RADEX ABDOMEN 1 VIEW: CPT | Mod: 26

## 2020-02-23 PROCEDURE — 32557 INSERT CATH PLEURA W/ IMAGE: CPT | Mod: LT

## 2020-02-23 PROCEDURE — 36247 INS CATH ABD/L-EXT ART 3RD: CPT | Mod: LT

## 2020-02-23 PROCEDURE — 49013 PRPERTL PEL PACK HEMRRG TRMA: CPT | Mod: GC

## 2020-02-23 PROCEDURE — 99292 CRITICAL CARE ADDL 30 MIN: CPT

## 2020-02-23 PROCEDURE — 72170 X-RAY EXAM OF PELVIS: CPT | Mod: 26

## 2020-02-23 PROCEDURE — 12042 INTMD RPR N-HF/GENIT2.6-7.5: CPT | Mod: GC,59

## 2020-02-23 PROCEDURE — 76705 ECHO EXAM OF ABDOMEN: CPT | Mod: 26

## 2020-02-23 PROCEDURE — 37242 VASC EMBOLIZE/OCCLUDE ARTERY: CPT

## 2020-02-23 PROCEDURE — 36248 INS CATH ABD/L-EXT ART ADDL: CPT

## 2020-02-23 PROCEDURE — 71045 X-RAY EXAM CHEST 1 VIEW: CPT | Mod: 26,76

## 2020-02-23 PROCEDURE — 36556 INSERT NON-TUNNEL CV CATH: CPT

## 2020-02-23 PROCEDURE — 76604 US EXAM CHEST: CPT | Mod: 26

## 2020-02-23 PROCEDURE — 12002 RPR S/N/AX/GEN/TRNK2.6-7.5CM: CPT | Mod: GC,59

## 2020-02-23 PROCEDURE — 49014 REEXPLORATION PELVIC WOUND: CPT | Mod: GC

## 2020-02-23 PROCEDURE — 99221 1ST HOSP IP/OBS SF/LOW 40: CPT | Mod: 57,25

## 2020-02-23 PROCEDURE — 99291 CRITICAL CARE FIRST HOUR: CPT

## 2020-02-23 PROCEDURE — 36620 INSERTION CATHETER ARTERY: CPT | Mod: GC,59

## 2020-02-23 RX ORDER — SODIUM CHLORIDE 9 MG/ML
1000 INJECTION, SOLUTION INTRAVENOUS
Refills: 0 | Status: DISCONTINUED | OUTPATIENT
Start: 2020-02-23 | End: 2020-02-23

## 2020-02-23 RX ORDER — TRANEXAMIC ACID 100 MG/ML
1000 INJECTION, SOLUTION INTRAVENOUS ONCE
Refills: 0 | Status: DISCONTINUED | OUTPATIENT
Start: 2020-02-23 | End: 2020-02-23

## 2020-02-23 RX ORDER — PIPERACILLIN AND TAZOBACTAM 4; .5 G/20ML; G/20ML
3.38 INJECTION, POWDER, LYOPHILIZED, FOR SOLUTION INTRAVENOUS ONCE
Refills: 0 | Status: DISCONTINUED | OUTPATIENT
Start: 2020-02-23 | End: 2020-02-23

## 2020-02-23 RX ORDER — PIPERACILLIN AND TAZOBACTAM 4; .5 G/20ML; G/20ML
3.38 INJECTION, POWDER, LYOPHILIZED, FOR SOLUTION INTRAVENOUS EVERY 8 HOURS
Refills: 0 | Status: DISCONTINUED | OUTPATIENT
Start: 2020-02-24 | End: 2020-02-23

## 2020-02-23 RX ORDER — CHLORHEXIDINE GLUCONATE 213 G/1000ML
15 SOLUTION TOPICAL EVERY 12 HOURS
Refills: 0 | Status: DISCONTINUED | OUTPATIENT
Start: 2020-02-23 | End: 2020-02-23

## 2020-02-23 RX ORDER — SODIUM CHLORIDE 9 MG/ML
1000 INJECTION INTRAMUSCULAR; INTRAVENOUS; SUBCUTANEOUS ONCE
Refills: 0 | Status: COMPLETED | OUTPATIENT
Start: 2020-02-23 | End: 2020-02-23

## 2020-02-23 RX ORDER — VASOPRESSIN 20 [USP'U]/ML
0.03 INJECTION INTRAVENOUS
Qty: 50 | Refills: 0 | Status: DISCONTINUED | OUTPATIENT
Start: 2020-02-23 | End: 2020-02-23

## 2020-02-23 RX ORDER — NOREPINEPHRINE BITARTRATE/D5W 8 MG/250ML
0.05 PLASTIC BAG, INJECTION (ML) INTRAVENOUS
Qty: 8 | Refills: 0 | Status: DISCONTINUED | OUTPATIENT
Start: 2020-02-23 | End: 2020-02-23

## 2020-02-23 RX ADMIN — SODIUM CHLORIDE 4000 MILLILITER(S): 9 INJECTION INTRAMUSCULAR; INTRAVENOUS; SUBCUTANEOUS at 14:47
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
## 2020-02-23 NOTE — CHART NOTE - NSCHARTNOTEFT_GEN_A_CORE
The patient was brought to the SICU in critical condition after undergoing emergent preperitoneal pelvic packing and left internal iliac artery angioembolization for retroperitoneal hemorrhage related to unstable pelvic fracture. Her condition failed to improve, and she developed hemoptysis in the endotracheal tube. She subsequently had a recurrent episode of cardiac arrest that was refractory to attempted resuscitation, so she was pronounced dead.    I gave my condolences to her family and offered to call the .

## 2020-02-23 NOTE — PROCEDURE NOTE - NSICDXPROCEDURE_GEN_ALL_CORE_FT
PROCEDURES:  Ultrasound guidance for thoracentesis of left pleural cavity with insertion of tube 23-Feb-2020 22:25:00  Ronald Watts

## 2020-02-23 NOTE — PROCEDURE NOTE - ADDITIONAL PROCEDURE DETAILS
done emergently during cardiac arrest done emergently during cardiac arrest  see 23-Feb-2020 19:20 Progress Note Adult-Trauma Surgery Attending for image documentation
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
## 2020-02-23 NOTE — ED PROVIDER NOTE - PROGRESS NOTE DETAILS
Surg seen, to the OR for pelvic packing. XR left ribs fx, Left inf/sup pubic ramus fx, PT b/p hypotensive transfused and transferred to OR  Gautam Robertson MD, Facep

## 2020-02-23 NOTE — PROGRESS NOTE ADULT - SUBJECTIVE AND OBJECTIVE BOX
Interventional Radiology Brief - Operative Note    Procedure: Bilateral common iliac arteriography, left internal iliac artery embolization with Avitene slurry    Operators: MADISON Mishra MD, JOSE RAMON Short MD    Anesthesia (type): General     Contrast: 42 mL    EBL: < 1 mL    Findings/Follow up Plan of Care: Left internal iliac arteriogram demonstrated several areas of abnormal vascularity; left internal iliac artery embolized to stasis using Avitene slurry.     Specimens Removed: none    Implants: as above    Complications: none    Condition/Disposition: critical / SICU    Please call Interventional Radiology with any questions, concerns, or issues.      Official report to follow.
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
## 2020-02-24 PROCEDURE — 80307 DRUG TEST PRSMV CHEM ANLYZR: CPT

## 2020-02-24 PROCEDURE — 74018 RADEX ABDOMEN 1 VIEW: CPT

## 2020-02-24 PROCEDURE — 99285 EMERGENCY DEPT VISIT HI MDM: CPT | Mod: 25

## 2020-02-24 PROCEDURE — 82330 ASSAY OF CALCIUM: CPT

## 2020-02-24 PROCEDURE — 37242 VASC EMBOLIZE/OCCLUDE ARTERY: CPT

## 2020-02-24 PROCEDURE — 86900 BLOOD TYPING SEROLOGIC ABO: CPT

## 2020-02-24 PROCEDURE — 36248 INS CATH ABD/L-EXT ART ADDL: CPT

## 2020-02-24 PROCEDURE — 84295 ASSAY OF SERUM SODIUM: CPT

## 2020-02-24 PROCEDURE — C1769: CPT

## 2020-02-24 PROCEDURE — P9012: CPT

## 2020-02-24 PROCEDURE — P9016: CPT

## 2020-02-24 PROCEDURE — 80053 COMPREHEN METABOLIC PANEL: CPT

## 2020-02-24 PROCEDURE — P9011: CPT

## 2020-02-24 PROCEDURE — 36247 INS CATH ABD/L-EXT ART 3RD: CPT

## 2020-02-24 PROCEDURE — 83605 ASSAY OF LACTIC ACID: CPT

## 2020-02-24 PROCEDURE — P9059: CPT

## 2020-02-24 PROCEDURE — 86850 RBC ANTIBODY SCREEN: CPT

## 2020-02-24 PROCEDURE — C1887: CPT

## 2020-02-24 PROCEDURE — 72170 X-RAY EXAM OF PELVIS: CPT

## 2020-02-24 PROCEDURE — 12002 RPR S/N/AX/GEN/TRNK2.6-7.5CM: CPT

## 2020-02-24 PROCEDURE — P9017: CPT

## 2020-02-24 PROCEDURE — 85014 HEMATOCRIT: CPT

## 2020-02-24 PROCEDURE — 85027 COMPLETE CBC AUTOMATED: CPT

## 2020-02-24 PROCEDURE — 84100 ASSAY OF PHOSPHORUS: CPT

## 2020-02-24 PROCEDURE — 83735 ASSAY OF MAGNESIUM: CPT

## 2020-02-24 PROCEDURE — P9037: CPT

## 2020-02-24 PROCEDURE — 84132 ASSAY OF SERUM POTASSIUM: CPT

## 2020-02-24 PROCEDURE — 86923 COMPATIBILITY TEST ELECTRIC: CPT

## 2020-02-24 PROCEDURE — 86901 BLOOD TYPING SEROLOGIC RH(D): CPT

## 2020-02-24 PROCEDURE — 85730 THROMBOPLASTIN TIME PARTIAL: CPT

## 2020-02-24 PROCEDURE — 36430 TRANSFUSION BLD/BLD COMPNT: CPT

## 2020-02-24 PROCEDURE — 80076 HEPATIC FUNCTION PANEL: CPT

## 2020-02-24 PROCEDURE — 85384 FIBRINOGEN ACTIVITY: CPT

## 2020-02-24 PROCEDURE — 82803 BLOOD GASES ANY COMBINATION: CPT

## 2020-02-24 PROCEDURE — 85610 PROTHROMBIN TIME: CPT

## 2020-02-24 PROCEDURE — C1894: CPT

## 2020-02-24 PROCEDURE — 82435 ASSAY OF BLOOD CHLORIDE: CPT

## 2020-02-24 PROCEDURE — 83690 ASSAY OF LIPASE: CPT

## 2020-02-24 PROCEDURE — 71045 X-RAY EXAM CHEST 1 VIEW: CPT

## 2020-02-24 PROCEDURE — 94002 VENT MGMT INPAT INIT DAY: CPT

## 2020-02-24 PROCEDURE — 82947 ASSAY GLUCOSE BLOOD QUANT: CPT

## 2020-02-24 PROCEDURE — 82565 ASSAY OF CREATININE: CPT

## 2020-02-24 PROCEDURE — 80048 BASIC METABOLIC PNL TOTAL CA: CPT

## 2020-03-02 PROBLEM — H40.9 UNSPECIFIED GLAUCOMA: Chronic | Status: ACTIVE | Noted: 2020-01-02

## 2020-03-02 PROBLEM — E78.5 HYPERLIPIDEMIA, UNSPECIFIED: Chronic | Status: ACTIVE | Noted: 2020-01-03

## 2020-03-02 PROBLEM — C50.919 MALIGNANT NEOPLASM OF UNSPECIFIED SITE OF UNSPECIFIED FEMALE BREAST: Chronic | Status: ACTIVE | Noted: 2020-01-02

## 2020-03-02 PROBLEM — I50.9 HEART FAILURE, UNSPECIFIED: Chronic | Status: ACTIVE | Noted: 2020-01-03

## 2020-03-02 PROBLEM — I51.81 TAKOTSUBO SYNDROME: Chronic | Status: ACTIVE | Noted: 2020-01-02

## 2020-05-05 ENCOUNTER — APPOINTMENT (OUTPATIENT)
Dept: CARDIOLOGY | Facility: CLINIC | Age: 85
End: 2020-05-05

## 2020-07-09 NOTE — ED PROVIDER NOTE - PHYSICAL EXAMINATION
Gen: unresponsive; intubated. GCS <8  Head: NCAT  HEENT: PERRL, MMM, normal conjunctiva, anicteric, neck supple; ecchymoses on L eye, 3mm sluggishly reactive bilaterally  Lung: CTAB, no adventitious sounds  CV: RRR, no murmurs, rubs or gallops  Abd: soft, NTND, no rebound or guarding, no CVAT  MSK: L pelvis unstable; R fem pulse > L fem pulse  Neuro: GCS <8  Skin: Warm and dry, no evidence of rash  Psych: normal mood and affect
activity/movement
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
## 2021-07-06 NOTE — PATIENT PROFILE ADULT - NSPROMEDSBROUGHTTOHOSP_GEN_A_NUR
no Constitutional: no fever, chills, no recent weight loss, change in appetite or malaise  Eyes: no redness/discharge/pain/vision changes  ENT: no rhinorrhea/ear pain/sore throat  Cardiac: No chest pain, SOB or edema.  Respiratory: No cough or respiratory distress  GI: No nausea, vomiting, diarrhea  : No dysuria, frequency, urgency or hematuria  MS: no pain to back or extremities, no loss of ROM, no weakness  Neuro: No headache or weakness. No LOC.  Skin: No skin rash.  Endocrine: No history of thyroid disease or diabetes.  Except as documented in the HPI, all other systems are negative.

## 2021-10-09 NOTE — H&P ADULT - PROBLEM SELECTOR PLAN 2
Hematuria -Recovered EF, would be conservative with giving fluids, but should not preclude treating for hypotension and insensible losses  -Patient encouraged to eat and drink normally
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
## 2022-12-22 NOTE — H&P ADULT - NSHPREVIEWOFSYSTEMS_GEN_ALL_CORE
"  CHIEF CONCERN: Ingrown Toenail (Right Great toenail)             HPI:    Dennis Franklin is a 33 y.o. male with concerns of painful toenail on the right hallux.    The pain started weeks ago.  Pain aggravated by direct pressure and close toe shoes.   Patient denies acute trauma to the toe.    Home treatment:  clipping;   He has history of chemical matrixectomy in this area.         There is no problem list on file for this patient.          Current Outpatient Medications on File Prior to Visit   Medication Sig Dispense Refill    [DISCONTINUED] acetaminophen-codeine 300-30mg (TYLENOL #3) 300-30 mg Tab Take one po q 8 hrs prn severe pain. 20 tablet 0    [DISCONTINUED] sulfamethoxazole-trimethoprim 400-80mg (BACTRIM,SEPTRA) 400-80 mg per tablet Take 1 tablet by mouth 2 (two) times daily. (Patient not taking: Reported on 6/16/2022) 20 tablet 1    [DISCONTINUED] tobramycin sulfate 0.3% (TOBREX) 0.3 % ophthalmic solution 1-2 drops topically twice daily to affected toe(s). 5 mL 3     No current facility-administered medications on file prior to visit.            Review of patient's allergies indicates:  No Known Allergies        ROS:  General ROS: negative for chills, fatigue or fever  Cardiovascular ROS: no chest pain or dyspnea on exertion  Musculoskeletal ROS: negative for - joint pain or joint stiffness.  Negative for loss of strength  Neuro ROS: Negative for syncope, numbness, or muscle weakness  Skin ROS: Negative for rash, itching or hair changes.  +Toenail changes        EXAM:   Vitals:    12/22/22 1218   BP: 110/60   Weight: 65.8 kg (145 lb)   Height: 5' 7" (1.702 m)       Right LOWER EXTREMITY EXAM:    Vascular:    Dorsalis pedis and posterior tibial pulses are palpable. capillary refill time is within normal limits   Toes are warm to touch.    There is  presence of digital hair.    There is  moderate localized edema to the affected toe.     Neurological:    Light touch, proprioception, and sharp/dull sensation are " all intact.   Mulders click:  Absent  Tinels:  Absent.   No LOPS    Dermatological:    The toenail is incurvated, Lateral border of the right  hallux.      moderate erythema noted to the affected area.     Absent paronychia.     Absent abscess      Musculoskeletal:    Muscle strength is 5/5 in all groups .    No swelling/crepitus at the interphalangeal joints.  non palpable pain 1st and 2nd PIPJ right   non palpable pain 1st and 2nd DIPJ right                ASSESSMENT/PLAN     Problem List Items Addressed This Visit    None  Visit Diagnoses       Toe pain, right    -  Primary    Ingrown nail                  I counseled the patient on the patient's  conditions, their implications and medical management.     Patient interested in chemical matrixectomy procedure today for painful ingrown nail.     Nail Removal    Date/Time: 12/22/2022 12:30 PM  Performed by: Asiya Fitzgerald DPM  Authorized by: Asiya Fitzgerald DPM     Consent Done?:  Yes (Written)  Location:     Location:  Right foot    Location detail:  Right big toe  Anesthesia:     Anesthesia:  Local infiltration    Local anesthetic:  Bupivacaine 0.5% without epinephrine and lidocaine 2% without epinephrine    Anesthetic total (ml):  3  Procedure Details:     Preparation:  Skin prepped with Betadine    Amount removed:  Partial    Side:  Lateral    Wedge excision of skin of nail fold: No      Nail bed sutured?: No      Nail matrix removed:  Partial (Phenol was used)    Removal method:  Phenol and alcohol    Dressing applied:  Antibiotic ointment and dressing applied    Patient tolerance:  Patient tolerated the procedure well with no immediate complications      Verbal and written post operative instructions were provided to the patient.     Patient to monitor for any adverse reactions and follow up in 10-14 days post op            Asiya Fitzgerald DPM     see above

## 2023-05-05 NOTE — PROCEDURE NOTE - NSTIMEOUT_GEN_A_CORE
Patient's first and last name, , procedure, and correct site confirmed prior to the start of procedure. Offered, attempted but was not successful

## 2023-06-06 NOTE — PROCEDURE NOTE - NSPROCDETAILS_GEN_ALL_CORE
Seldinger technique/secured in place/stapled in place/thoracostomy tube placed percutaneously/ultrasound assessment of fluid (location)
location identified, draped/prepped, sterile technique used, needle inserted/introduced
impairments found/functional limitations in following categories

## 2023-06-30 NOTE — DISCHARGE NOTE ADULT - SECONDARY DIAGNOSIS.
Hypertension, unspecified type Rituxan Counseling:  I discussed with the patient the risks of Rituxan infusions. Side effects can include infusion reactions, severe drug rashes including mucocutaneous reactions, reactivation of latent hepatitis and other infections and rarely progressive multifocal leukoencephalopathy.  All of the patient's questions and concerns were addressed.

## 2024-03-17 NOTE — PATIENT PROFILE ADULT - ABILITY TO HEAR (WITH HEARING AID OR HEARING APPLIANCE IF NORMALLY USED):
Mildly to Moderately Impaired: difficulty hearing in some environments or speaker may need to increase volume or speak distinctly
97

## 2024-05-23 NOTE — PRE-ANESTHESIA EVALUATION ADULT - NSATTENDATTESTRD_GEN_ALL_CORE
Continue losartan     The patient has been re-examined and I agree with the above assessment or I updated with my findings.

## 2024-05-28 NOTE — DISCHARGE NOTE ADULT - CARE PROVIDER_API CALL
Subjective:      Patient ID: Mary Murray Stephanie Bossier, PA-C [facility]  Orthopedic Surgery    Medical Dictation software was used during the dictation of portions or the entirety of this medical record.  Phonetic or grammatic errors may exist due to the use of this software. For clarification, refer to the author of the document.      Cardiology, [place]  Phone: [phone]  Fax: (   )    -